# Patient Record
Sex: MALE | Race: WHITE | URBAN - METROPOLITAN AREA
[De-identification: names, ages, dates, MRNs, and addresses within clinical notes are randomized per-mention and may not be internally consistent; named-entity substitution may affect disease eponyms.]

---

## 2017-01-01 ENCOUNTER — EMERGENCY (EMERGENCY)
Facility: HOSPITAL | Age: 82
LOS: 1 days | End: 2017-01-01
Payer: MEDICARE

## 2017-01-01 ENCOUNTER — INPATIENT (INPATIENT)
Facility: HOSPITAL | Age: 82
LOS: 5 days | DRG: 963 | End: 2017-10-14
Attending: SURGERY | Admitting: SURGERY
Payer: MEDICARE

## 2017-01-01 VITALS — WEIGHT: 195.11 LBS | HEIGHT: 68 IN

## 2017-01-01 VITALS — OXYGEN SATURATION: 90 % | HEART RATE: 113 BPM

## 2017-01-01 DIAGNOSIS — Z51.5 ENCOUNTER FOR PALLIATIVE CARE: ICD-10-CM

## 2017-01-01 DIAGNOSIS — G93.41 METABOLIC ENCEPHALOPATHY: ICD-10-CM

## 2017-01-01 DIAGNOSIS — R45.1 RESTLESSNESS AND AGITATION: ICD-10-CM

## 2017-01-01 DIAGNOSIS — R06.02 SHORTNESS OF BREATH: ICD-10-CM

## 2017-01-01 DIAGNOSIS — I61.9 NONTRAUMATIC INTRACEREBRAL HEMORRHAGE, UNSPECIFIED: ICD-10-CM

## 2017-01-01 DIAGNOSIS — Z99.11 DEPENDENCE ON RESPIRATOR [VENTILATOR] STATUS: ICD-10-CM

## 2017-01-01 DIAGNOSIS — G93.40 ENCEPHALOPATHY, UNSPECIFIED: ICD-10-CM

## 2017-01-01 DIAGNOSIS — I62.00 NONTRAUMATIC SUBDURAL HEMORRHAGE, UNSPECIFIED: ICD-10-CM

## 2017-01-01 DIAGNOSIS — R53.81 OTHER MALAISE: ICD-10-CM

## 2017-01-01 LAB
ABO RH CONFIRMATION: SIGNIFICANT CHANGE UP
ALBUMIN SERPL ELPH-MCNC: 2.6 G/DL — LOW (ref 3.3–5.2)
ALBUMIN SERPL ELPH-MCNC: 3 G/DL — LOW (ref 3.3–5.2)
ALP SERPL-CCNC: 50 U/L — SIGNIFICANT CHANGE UP (ref 40–120)
ALP SERPL-CCNC: 55 U/L — SIGNIFICANT CHANGE UP (ref 40–120)
ALT FLD-CCNC: 26 U/L — SIGNIFICANT CHANGE UP
ALT FLD-CCNC: 41 U/L — HIGH
ANION GAP SERPL CALC-SCNC: 10 MMOL/L — SIGNIFICANT CHANGE UP (ref 5–17)
ANION GAP SERPL CALC-SCNC: 11 MMOL/L — SIGNIFICANT CHANGE UP (ref 5–17)
ANION GAP SERPL CALC-SCNC: 16 MMOL/L — SIGNIFICANT CHANGE UP (ref 5–17)
ANION GAP SERPL CALC-SCNC: 18 MMOL/L — HIGH (ref 5–17)
ANION GAP SERPL CALC-SCNC: 19 MMOL/L — HIGH (ref 5–17)
ANION GAP SERPL CALC-SCNC: 20 MMOL/L — HIGH (ref 5–17)
ANION GAP SERPL CALC-SCNC: 9 MMOL/L — SIGNIFICANT CHANGE UP (ref 5–17)
ANION GAP SERPL CALC-SCNC: 9 MMOL/L — SIGNIFICANT CHANGE UP (ref 5–17)
ANISOCYTOSIS BLD QL: SLIGHT — SIGNIFICANT CHANGE UP
ANISOCYTOSIS BLD QL: SLIGHT — SIGNIFICANT CHANGE UP
APTT BLD: 34.8 SEC — SIGNIFICANT CHANGE UP (ref 27.5–37.4)
AST SERPL-CCNC: 33 U/L — SIGNIFICANT CHANGE UP
AST SERPL-CCNC: 74 U/L — HIGH
BASOPHILS # BLD AUTO: 0 K/UL — SIGNIFICANT CHANGE UP (ref 0–0.2)
BASOPHILS NFR BLD AUTO: 0.1 % — SIGNIFICANT CHANGE UP (ref 0–2)
BASOPHILS NFR BLD AUTO: 0.1 % — SIGNIFICANT CHANGE UP (ref 0–2)
BILIRUB SERPL-MCNC: 0.6 MG/DL — SIGNIFICANT CHANGE UP (ref 0.4–2)
BILIRUB SERPL-MCNC: 1.2 MG/DL — SIGNIFICANT CHANGE UP (ref 0.4–2)
BLD GP AB SCN SERPL QL: SIGNIFICANT CHANGE UP
BUN SERPL-MCNC: 30 MG/DL — HIGH (ref 8–20)
BUN SERPL-MCNC: 30 MG/DL — HIGH (ref 8–20)
BUN SERPL-MCNC: 31 MG/DL — HIGH (ref 8–20)
BUN SERPL-MCNC: 32 MG/DL — HIGH (ref 8–20)
BUN SERPL-MCNC: 37 MG/DL — HIGH (ref 8–20)
BUN SERPL-MCNC: 46 MG/DL — HIGH (ref 8–20)
BUN SERPL-MCNC: 48 MG/DL — HIGH (ref 8–20)
BUN SERPL-MCNC: 51 MG/DL — HIGH (ref 8–20)
CALCIUM SERPL-MCNC: 7.5 MG/DL — LOW (ref 8.6–10.2)
CALCIUM SERPL-MCNC: 7.6 MG/DL — LOW (ref 8.6–10.2)
CALCIUM SERPL-MCNC: 7.7 MG/DL — LOW (ref 8.6–10.2)
CALCIUM SERPL-MCNC: 7.8 MG/DL — LOW (ref 8.6–10.2)
CALCIUM SERPL-MCNC: 7.8 MG/DL — LOW (ref 8.6–10.2)
CALCIUM SERPL-MCNC: 7.9 MG/DL — LOW (ref 8.6–10.2)
CALCIUM SERPL-MCNC: 7.9 MG/DL — LOW (ref 8.6–10.2)
CALCIUM SERPL-MCNC: 8.1 MG/DL — LOW (ref 8.6–10.2)
CHLORIDE SERPL-SCNC: 103 MMOL/L — SIGNIFICANT CHANGE UP (ref 98–107)
CHLORIDE SERPL-SCNC: 106 MMOL/L — SIGNIFICANT CHANGE UP (ref 98–107)
CHLORIDE SERPL-SCNC: 107 MMOL/L — SIGNIFICANT CHANGE UP (ref 98–107)
CHLORIDE SERPL-SCNC: 108 MMOL/L — HIGH (ref 98–107)
CK MB CFR SERPL CALC: 12.9 NG/ML — HIGH (ref 0–6.7)
CK MB CFR SERPL CALC: 13.8 NG/ML — HIGH (ref 0–6.7)
CK MB CFR SERPL CALC: 14.4 NG/ML — HIGH (ref 0–6.7)
CK MB CFR SERPL CALC: 6.5 NG/ML — SIGNIFICANT CHANGE UP (ref 0–6.7)
CK SERPL-CCNC: 1491 U/L — HIGH (ref 30–200)
CK SERPL-CCNC: 1783 U/L — HIGH (ref 30–200)
CK SERPL-CCNC: 1800 U/L — HIGH (ref 30–200)
CK SERPL-CCNC: 475 U/L — HIGH (ref 30–200)
CO2 SERPL-SCNC: 19 MMOL/L — LOW (ref 22–29)
CO2 SERPL-SCNC: 19 MMOL/L — LOW (ref 22–29)
CO2 SERPL-SCNC: 20 MMOL/L — LOW (ref 22–29)
CO2 SERPL-SCNC: 22 MMOL/L — SIGNIFICANT CHANGE UP (ref 22–29)
CO2 SERPL-SCNC: 24 MMOL/L — SIGNIFICANT CHANGE UP (ref 22–29)
CO2 SERPL-SCNC: 27 MMOL/L — SIGNIFICANT CHANGE UP (ref 22–29)
CO2 SERPL-SCNC: 28 MMOL/L — SIGNIFICANT CHANGE UP (ref 22–29)
CO2 SERPL-SCNC: 29 MMOL/L — SIGNIFICANT CHANGE UP (ref 22–29)
CREAT SERPL-MCNC: 0.67 MG/DL — SIGNIFICANT CHANGE UP (ref 0.5–1.3)
CREAT SERPL-MCNC: 0.68 MG/DL — SIGNIFICANT CHANGE UP (ref 0.5–1.3)
CREAT SERPL-MCNC: 0.75 MG/DL — SIGNIFICANT CHANGE UP (ref 0.5–1.3)
CREAT SERPL-MCNC: 0.79 MG/DL — SIGNIFICANT CHANGE UP (ref 0.5–1.3)
CREAT SERPL-MCNC: 1.4 MG/DL — HIGH (ref 0.5–1.3)
CREAT SERPL-MCNC: 1.93 MG/DL — HIGH (ref 0.5–1.3)
CREAT SERPL-MCNC: 2.23 MG/DL — HIGH (ref 0.5–1.3)
CREAT SERPL-MCNC: 2.46 MG/DL — HIGH (ref 0.5–1.3)
EOSINOPHIL # BLD AUTO: 0 K/UL — SIGNIFICANT CHANGE UP (ref 0–0.5)
EOSINOPHIL NFR BLD AUTO: 0 % — SIGNIFICANT CHANGE UP (ref 0–6)
EOSINOPHIL NFR BLD AUTO: 0.1 % — SIGNIFICANT CHANGE UP (ref 0–5)
EOSINOPHIL NFR BLD AUTO: 0.3 % — SIGNIFICANT CHANGE UP (ref 0–5)
EOSINOPHIL NFR BLD AUTO: 0.4 % — SIGNIFICANT CHANGE UP (ref 0–5)
EOSINOPHIL NFR BLD AUTO: 1 % — SIGNIFICANT CHANGE UP (ref 0–6)
GAS PNL BLDA: SIGNIFICANT CHANGE UP
GLUCOSE BLDC GLUCOMTR-MCNC: 120 MG/DL — HIGH (ref 70–99)
GLUCOSE BLDC GLUCOMTR-MCNC: 121 MG/DL — HIGH (ref 70–99)
GLUCOSE BLDC GLUCOMTR-MCNC: 130 MG/DL — HIGH (ref 70–99)
GLUCOSE BLDC GLUCOMTR-MCNC: 131 MG/DL — HIGH (ref 70–99)
GLUCOSE BLDC GLUCOMTR-MCNC: 132 MG/DL — HIGH (ref 70–99)
GLUCOSE BLDC GLUCOMTR-MCNC: 140 MG/DL — HIGH (ref 70–99)
GLUCOSE BLDC GLUCOMTR-MCNC: 141 MG/DL — HIGH (ref 70–99)
GLUCOSE BLDC GLUCOMTR-MCNC: 142 MG/DL — HIGH (ref 70–99)
GLUCOSE BLDC GLUCOMTR-MCNC: 154 MG/DL — HIGH (ref 70–99)
GLUCOSE BLDC GLUCOMTR-MCNC: 161 MG/DL — HIGH (ref 70–99)
GLUCOSE BLDC GLUCOMTR-MCNC: 167 MG/DL — HIGH (ref 70–99)
GLUCOSE BLDC GLUCOMTR-MCNC: 180 MG/DL — HIGH (ref 70–99)
GLUCOSE SERPL-MCNC: 108 MG/DL — SIGNIFICANT CHANGE UP (ref 70–115)
GLUCOSE SERPL-MCNC: 113 MG/DL — SIGNIFICANT CHANGE UP (ref 70–115)
GLUCOSE SERPL-MCNC: 115 MG/DL — SIGNIFICANT CHANGE UP (ref 70–115)
GLUCOSE SERPL-MCNC: 121 MG/DL — HIGH (ref 70–115)
GLUCOSE SERPL-MCNC: 143 MG/DL — HIGH (ref 70–115)
GLUCOSE SERPL-MCNC: 145 MG/DL — HIGH (ref 70–115)
GLUCOSE SERPL-MCNC: 177 MG/DL — HIGH (ref 70–115)
GLUCOSE SERPL-MCNC: 185 MG/DL — HIGH (ref 70–115)
HBA1C BLD-MCNC: 5.6 % — SIGNIFICANT CHANGE UP (ref 4–5.6)
HCT VFR BLD CALC: 31.1 % — LOW (ref 42–52)
HCT VFR BLD CALC: 32.3 % — LOW (ref 42–52)
HCT VFR BLD CALC: 32.4 % — LOW (ref 42–52)
HCT VFR BLD CALC: 33.1 % — LOW (ref 42–52)
HCT VFR BLD CALC: 33.2 % — LOW (ref 42–52)
HCT VFR BLD CALC: 35.5 % — LOW (ref 42–52)
HCT VFR BLD CALC: 35.8 % — LOW (ref 42–52)
HCT VFR BLD CALC: 38.3 % — LOW (ref 42–52)
HGB BLD-MCNC: 10.5 G/DL — LOW (ref 14–18)
HGB BLD-MCNC: 10.7 G/DL — LOW (ref 14–18)
HGB BLD-MCNC: 10.9 G/DL — LOW (ref 14–18)
HGB BLD-MCNC: 10.9 G/DL — LOW (ref 14–18)
HGB BLD-MCNC: 11.1 G/DL — LOW (ref 14–18)
HGB BLD-MCNC: 11.3 G/DL — LOW (ref 14–18)
HGB BLD-MCNC: 11.4 G/DL — LOW (ref 14–18)
HGB BLD-MCNC: 12.1 G/DL — LOW (ref 14–18)
HYPOCHROMIA BLD QL: SLIGHT — SIGNIFICANT CHANGE UP
INR BLD: 1.37 RATIO — HIGH (ref 0.88–1.16)
INR BLD: 1.38 RATIO — HIGH (ref 0.88–1.16)
LACTATE BLDV-MCNC: 4.4 MMOL/L — CRITICAL HIGH (ref 0.5–2)
LYMPHOCYTES # BLD AUTO: 0.5 K/UL — LOW (ref 1–4.8)
LYMPHOCYTES # BLD AUTO: 0.5 K/UL — LOW (ref 1–4.8)
LYMPHOCYTES # BLD AUTO: 0.6 K/UL — LOW (ref 1–4.8)
LYMPHOCYTES # BLD AUTO: 0.6 K/UL — LOW (ref 1–4.8)
LYMPHOCYTES # BLD AUTO: 1.1 K/UL — SIGNIFICANT CHANGE UP (ref 1–4.8)
LYMPHOCYTES # BLD AUTO: 7.6 % — LOW (ref 20–55)
LYMPHOCYTES # BLD AUTO: 7.8 % — LOW (ref 20–55)
LYMPHOCYTES # BLD AUTO: 7.9 % — LOW (ref 20–55)
LYMPHOCYTES # BLD AUTO: 8 % — LOW (ref 20–55)
LYMPHOCYTES # BLD AUTO: 8 % — LOW (ref 20–55)
LYMPHOCYTES # BLD AUTO: 8.8 % — LOW (ref 20–55)
LYMPHOCYTES # BLD AUTO: 9 % — LOW (ref 20–55)
MAGNESIUM SERPL-MCNC: 2 MG/DL — SIGNIFICANT CHANGE UP (ref 1.6–2.6)
MAGNESIUM SERPL-MCNC: 2 MG/DL — SIGNIFICANT CHANGE UP (ref 1.6–2.6)
MAGNESIUM SERPL-MCNC: 2 MG/DL — SIGNIFICANT CHANGE UP (ref 1.8–2.6)
MAGNESIUM SERPL-MCNC: 2.1 MG/DL — SIGNIFICANT CHANGE UP (ref 1.6–2.6)
MAGNESIUM SERPL-MCNC: 2.2 MG/DL — SIGNIFICANT CHANGE UP (ref 1.6–2.6)
MCHC RBC-ENTMCNC: 30.3 PG — SIGNIFICANT CHANGE UP (ref 27–31)
MCHC RBC-ENTMCNC: 30.5 PG — SIGNIFICANT CHANGE UP (ref 27–31)
MCHC RBC-ENTMCNC: 30.5 PG — SIGNIFICANT CHANGE UP (ref 27–31)
MCHC RBC-ENTMCNC: 30.6 PG — SIGNIFICANT CHANGE UP (ref 27–31)
MCHC RBC-ENTMCNC: 31.1 PG — HIGH (ref 27–31)
MCHC RBC-ENTMCNC: 31.6 G/DL — LOW (ref 32–36)
MCHC RBC-ENTMCNC: 31.6 PG — HIGH (ref 27–31)
MCHC RBC-ENTMCNC: 31.8 G/DL — LOW (ref 32–36)
MCHC RBC-ENTMCNC: 31.8 G/DL — LOW (ref 32–36)
MCHC RBC-ENTMCNC: 32.9 G/DL — SIGNIFICANT CHANGE UP (ref 32–36)
MCHC RBC-ENTMCNC: 33.1 G/DL — SIGNIFICANT CHANGE UP (ref 32–36)
MCHC RBC-ENTMCNC: 33.4 G/DL — SIGNIFICANT CHANGE UP (ref 32–36)
MCHC RBC-ENTMCNC: 33.6 G/DL — SIGNIFICANT CHANGE UP (ref 32–36)
MCHC RBC-ENTMCNC: 33.8 G/DL — SIGNIFICANT CHANGE UP (ref 32–36)
MCV RBC AUTO: 90.7 FL — SIGNIFICANT CHANGE UP (ref 80–94)
MCV RBC AUTO: 92 FL — SIGNIFICANT CHANGE UP (ref 80–94)
MCV RBC AUTO: 92.7 FL — SIGNIFICANT CHANGE UP (ref 80–94)
MCV RBC AUTO: 93 FL — SIGNIFICANT CHANGE UP (ref 80–94)
MCV RBC AUTO: 93.9 FL — SIGNIFICANT CHANGE UP (ref 80–94)
MCV RBC AUTO: 95.2 FL — HIGH (ref 80–94)
MCV RBC AUTO: 95.2 FL — HIGH (ref 80–94)
MCV RBC AUTO: 96 FL — HIGH (ref 80–94)
MONOCYTES # BLD AUTO: 0.8 K/UL — SIGNIFICANT CHANGE UP (ref 0–0.8)
MONOCYTES # BLD AUTO: 1.1 K/UL — HIGH (ref 0–0.8)
MONOCYTES # BLD AUTO: 1.2 K/UL — HIGH (ref 0–0.8)
MONOCYTES # BLD AUTO: 1.5 K/UL — HIGH (ref 0–0.8)
MONOCYTES # BLD AUTO: 1.7 K/UL — HIGH (ref 0–0.8)
MONOCYTES NFR BLD AUTO: 10 % — SIGNIFICANT CHANGE UP (ref 3–10)
MONOCYTES NFR BLD AUTO: 11.7 % — HIGH (ref 3–10)
MONOCYTES NFR BLD AUTO: 13.4 % — HIGH (ref 3–10)
MONOCYTES NFR BLD AUTO: 15.8 % — HIGH (ref 3–10)
MONOCYTES NFR BLD AUTO: 16.9 % — HIGH (ref 3–10)
MONOCYTES NFR BLD AUTO: 19 % — HIGH (ref 3–10)
MONOCYTES NFR BLD AUTO: 8 % — SIGNIFICANT CHANGE UP (ref 3–10)
NEUTROPHILS # BLD AUTO: 11.5 K/UL — HIGH (ref 1.8–8)
NEUTROPHILS # BLD AUTO: 4.7 K/UL — SIGNIFICANT CHANGE UP (ref 1.8–8)
NEUTROPHILS # BLD AUTO: 5 K/UL — SIGNIFICANT CHANGE UP (ref 1.8–8)
NEUTROPHILS # BLD AUTO: 5.3 K/UL — SIGNIFICANT CHANGE UP (ref 1.8–8)
NEUTROPHILS # BLD AUTO: 5.3 K/UL — SIGNIFICANT CHANGE UP (ref 1.8–8)
NEUTROPHILS NFR BLD AUTO: 50 % — SIGNIFICANT CHANGE UP (ref 37–73)
NEUTROPHILS NFR BLD AUTO: 70 % — SIGNIFICANT CHANGE UP (ref 37–73)
NEUTROPHILS NFR BLD AUTO: 74.5 % — HIGH (ref 37–73)
NEUTROPHILS NFR BLD AUTO: 75.3 % — HIGH (ref 37–73)
NEUTROPHILS NFR BLD AUTO: 77.2 % — HIGH (ref 37–73)
NEUTROPHILS NFR BLD AUTO: 80.3 % — HIGH (ref 37–73)
NEUTROPHILS NFR BLD AUTO: 83 % — HIGH (ref 37–73)
NEUTS BAND # BLD: 2 % — SIGNIFICANT CHANGE UP (ref 0–8)
NEUTS BAND # BLD: 2 % — SIGNIFICANT CHANGE UP (ref 0–8)
OSMOLALITY SERPL: 311 MOS/KG — HIGH (ref 275–300)
OVALOCYTES BLD QL SMEAR: SLIGHT — SIGNIFICANT CHANGE UP
OVALOCYTES BLD QL SMEAR: SLIGHT — SIGNIFICANT CHANGE UP
PA ADP PRP-ACNC: 307 PRU — SIGNIFICANT CHANGE UP (ref 180–376)
PHOSPHATE SERPL-MCNC: 1 MG/DL — CRITICAL LOW (ref 2.4–4.7)
PHOSPHATE SERPL-MCNC: 1.3 MG/DL — LOW (ref 2.4–4.7)
PHOSPHATE SERPL-MCNC: 1.5 MG/DL — LOW (ref 2.4–4.7)
PHOSPHATE SERPL-MCNC: 1.8 MG/DL — LOW (ref 2.4–4.7)
PHOSPHATE SERPL-MCNC: 3.2 MG/DL — SIGNIFICANT CHANGE UP (ref 2.4–4.7)
PHOSPHATE SERPL-MCNC: 4.6 MG/DL — SIGNIFICANT CHANGE UP (ref 2.4–4.7)
PHOSPHATE SERPL-MCNC: 4.8 MG/DL — HIGH (ref 2.4–4.7)
PLAT MORPH BLD: NORMAL — SIGNIFICANT CHANGE UP
PLATELET # BLD AUTO: 121 K/UL — LOW (ref 150–400)
PLATELET # BLD AUTO: 121 K/UL — LOW (ref 150–400)
PLATELET # BLD AUTO: 124 K/UL — LOW (ref 150–400)
PLATELET # BLD AUTO: 124 K/UL — LOW (ref 150–400)
PLATELET # BLD AUTO: 125 K/UL — LOW (ref 150–400)
PLATELET # BLD AUTO: 164 K/UL — SIGNIFICANT CHANGE UP (ref 150–400)
PLATELET # BLD AUTO: 169 K/UL — SIGNIFICANT CHANGE UP (ref 150–400)
PLATELET # BLD AUTO: 175 K/UL — SIGNIFICANT CHANGE UP (ref 150–400)
POIKILOCYTOSIS BLD QL AUTO: SLIGHT — SIGNIFICANT CHANGE UP
POIKILOCYTOSIS BLD QL AUTO: SLIGHT — SIGNIFICANT CHANGE UP
POTASSIUM SERPL-MCNC: 3.6 MMOL/L — SIGNIFICANT CHANGE UP (ref 3.5–5.3)
POTASSIUM SERPL-MCNC: 3.7 MMOL/L — SIGNIFICANT CHANGE UP (ref 3.5–5.3)
POTASSIUM SERPL-MCNC: 3.7 MMOL/L — SIGNIFICANT CHANGE UP (ref 3.5–5.3)
POTASSIUM SERPL-MCNC: 4.2 MMOL/L — SIGNIFICANT CHANGE UP (ref 3.5–5.3)
POTASSIUM SERPL-MCNC: 4.2 MMOL/L — SIGNIFICANT CHANGE UP (ref 3.5–5.3)
POTASSIUM SERPL-MCNC: 4.4 MMOL/L — SIGNIFICANT CHANGE UP (ref 3.5–5.3)
POTASSIUM SERPL-MCNC: 4.4 MMOL/L — SIGNIFICANT CHANGE UP (ref 3.5–5.3)
POTASSIUM SERPL-MCNC: 4.9 MMOL/L — SIGNIFICANT CHANGE UP (ref 3.5–5.3)
POTASSIUM SERPL-SCNC: 3.6 MMOL/L — SIGNIFICANT CHANGE UP (ref 3.5–5.3)
POTASSIUM SERPL-SCNC: 3.7 MMOL/L — SIGNIFICANT CHANGE UP (ref 3.5–5.3)
POTASSIUM SERPL-SCNC: 3.7 MMOL/L — SIGNIFICANT CHANGE UP (ref 3.5–5.3)
POTASSIUM SERPL-SCNC: 4.2 MMOL/L — SIGNIFICANT CHANGE UP (ref 3.5–5.3)
POTASSIUM SERPL-SCNC: 4.2 MMOL/L — SIGNIFICANT CHANGE UP (ref 3.5–5.3)
POTASSIUM SERPL-SCNC: 4.4 MMOL/L — SIGNIFICANT CHANGE UP (ref 3.5–5.3)
POTASSIUM SERPL-SCNC: 4.4 MMOL/L — SIGNIFICANT CHANGE UP (ref 3.5–5.3)
POTASSIUM SERPL-SCNC: 4.9 MMOL/L — SIGNIFICANT CHANGE UP (ref 3.5–5.3)
PROT SERPL-MCNC: 5.2 G/DL — LOW (ref 6.6–8.7)
PROT SERPL-MCNC: 5.3 G/DL — LOW (ref 6.6–8.7)
PROTHROM AB SERPL-ACNC: 15.2 SEC — HIGH (ref 9.8–12.7)
PROTHROM AB SERPL-ACNC: 15.3 SEC — HIGH (ref 9.8–12.7)
RBC # BLD: 3.43 M/UL — LOW (ref 4.6–6.2)
RBC # BLD: 3.45 M/UL — LOW (ref 4.6–6.2)
RBC # BLD: 3.51 M/UL — LOW (ref 4.6–6.2)
RBC # BLD: 3.57 M/UL — LOW (ref 4.6–6.2)
RBC # BLD: 3.57 M/UL — LOW (ref 4.6–6.2)
RBC # BLD: 3.73 M/UL — LOW (ref 4.6–6.2)
RBC # BLD: 3.76 M/UL — LOW (ref 4.6–6.2)
RBC # BLD: 3.99 M/UL — LOW (ref 4.6–6.2)
RBC # FLD: 14.9 % — SIGNIFICANT CHANGE UP (ref 11–15.6)
RBC # FLD: 15.2 % — SIGNIFICANT CHANGE UP (ref 11–15.6)
RBC # FLD: 15.5 % — SIGNIFICANT CHANGE UP (ref 11–15.6)
RBC # FLD: 15.6 % — SIGNIFICANT CHANGE UP (ref 11–15.6)
RBC # FLD: 15.7 % — HIGH (ref 11–15.6)
RBC # FLD: 15.8 % — HIGH (ref 11–15.6)
RBC # FLD: 15.9 % — HIGH (ref 11–15.6)
RBC # FLD: 16.2 % — HIGH (ref 11–15.6)
RBC BLD AUTO: ABNORMAL
RBC BLD AUTO: ABNORMAL
RBC BLD AUTO: NORMAL — SIGNIFICANT CHANGE UP
RBC BLD AUTO: NORMAL — SIGNIFICANT CHANGE UP
SODIUM SERPL-SCNC: 141 MMOL/L — SIGNIFICANT CHANGE UP (ref 135–145)
SODIUM SERPL-SCNC: 141 MMOL/L — SIGNIFICANT CHANGE UP (ref 135–145)
SODIUM SERPL-SCNC: 142 MMOL/L — SIGNIFICANT CHANGE UP (ref 135–145)
SODIUM SERPL-SCNC: 144 MMOL/L — SIGNIFICANT CHANGE UP (ref 135–145)
SODIUM SERPL-SCNC: 144 MMOL/L — SIGNIFICANT CHANGE UP (ref 135–145)
SODIUM SERPL-SCNC: 145 MMOL/L — SIGNIFICANT CHANGE UP (ref 135–145)
SODIUM SERPL-SCNC: 145 MMOL/L — SIGNIFICANT CHANGE UP (ref 135–145)
SODIUM SERPL-SCNC: 146 MMOL/L — HIGH (ref 135–145)
TROPONIN T SERPL-MCNC: 0.05 NG/ML — SIGNIFICANT CHANGE UP (ref 0–0.06)
TROPONIN T SERPL-MCNC: 0.06 NG/ML — SIGNIFICANT CHANGE UP (ref 0–0.06)
TYPE + AB SCN PNL BLD: SIGNIFICANT CHANGE UP
VARIANT LYMPHS # BLD: 1 % — SIGNIFICANT CHANGE UP (ref 0–6)
WBC # BLD: 10.6 K/UL — SIGNIFICANT CHANGE UP (ref 4.8–10.8)
WBC # BLD: 13.8 K/UL — HIGH (ref 4.8–10.8)
WBC # BLD: 14.3 K/UL — HIGH (ref 4.8–10.8)
WBC # BLD: 6.1 K/UL — SIGNIFICANT CHANGE UP (ref 4.8–10.8)
WBC # BLD: 6.6 K/UL — SIGNIFICANT CHANGE UP (ref 4.8–10.8)
WBC # BLD: 6.7 K/UL — SIGNIFICANT CHANGE UP (ref 4.8–10.8)
WBC # BLD: 7.1 K/UL — SIGNIFICANT CHANGE UP (ref 4.8–10.8)
WBC # BLD: 7.4 K/UL — SIGNIFICANT CHANGE UP (ref 4.8–10.8)
WBC # FLD AUTO: 10.6 K/UL — SIGNIFICANT CHANGE UP (ref 4.8–10.8)
WBC # FLD AUTO: 13.8 K/UL — HIGH (ref 4.8–10.8)
WBC # FLD AUTO: 14.3 K/UL — HIGH (ref 4.8–10.8)
WBC # FLD AUTO: 6.1 K/UL — SIGNIFICANT CHANGE UP (ref 4.8–10.8)
WBC # FLD AUTO: 6.6 K/UL — SIGNIFICANT CHANGE UP (ref 4.8–10.8)
WBC # FLD AUTO: 6.7 K/UL — SIGNIFICANT CHANGE UP (ref 4.8–10.8)
WBC # FLD AUTO: 7.1 K/UL — SIGNIFICANT CHANGE UP (ref 4.8–10.8)
WBC # FLD AUTO: 7.4 K/UL — SIGNIFICANT CHANGE UP (ref 4.8–10.8)

## 2017-01-01 PROCEDURE — 99232 SBSQ HOSP IP/OBS MODERATE 35: CPT

## 2017-01-01 PROCEDURE — 72125 CT NECK SPINE W/O DYE: CPT

## 2017-01-01 PROCEDURE — 83036 HEMOGLOBIN GLYCOSYLATED A1C: CPT

## 2017-01-01 PROCEDURE — 31500 INSERT EMERGENCY AIRWAY: CPT

## 2017-01-01 PROCEDURE — 85610 PROTHROMBIN TIME: CPT

## 2017-01-01 PROCEDURE — 96376 TX/PRO/DX INJ SAME DRUG ADON: CPT

## 2017-01-01 PROCEDURE — 86850 RBC ANTIBODY SCREEN: CPT

## 2017-01-01 PROCEDURE — 86901 BLOOD TYPING SEROLOGIC RH(D): CPT

## 2017-01-01 PROCEDURE — 86900 BLOOD TYPING SEROLOGIC ABO: CPT

## 2017-01-01 PROCEDURE — 74176 CT ABD & PELVIS W/O CONTRAST: CPT | Mod: 26

## 2017-01-01 PROCEDURE — 71010: CPT | Mod: 26

## 2017-01-01 PROCEDURE — 99233 SBSQ HOSP IP/OBS HIGH 50: CPT

## 2017-01-01 PROCEDURE — 83605 ASSAY OF LACTIC ACID: CPT

## 2017-01-01 PROCEDURE — 85576 BLOOD PLATELET AGGREGATION: CPT

## 2017-01-01 PROCEDURE — 85730 THROMBOPLASTIN TIME PARTIAL: CPT

## 2017-01-01 PROCEDURE — 70450 CT HEAD/BRAIN W/O DYE: CPT

## 2017-01-01 PROCEDURE — 84484 ASSAY OF TROPONIN QUANT: CPT

## 2017-01-01 PROCEDURE — 71250 CT THORAX DX C-: CPT | Mod: 26

## 2017-01-01 PROCEDURE — 71045 X-RAY EXAM CHEST 1 VIEW: CPT

## 2017-01-01 PROCEDURE — 99285 EMERGENCY DEPT VISIT HI MDM: CPT | Mod: 25

## 2017-01-01 PROCEDURE — 83930 ASSAY OF BLOOD OSMOLALITY: CPT

## 2017-01-01 PROCEDURE — 80053 COMPREHEN METABOLIC PANEL: CPT

## 2017-01-01 PROCEDURE — 96375 TX/PRO/DX INJ NEW DRUG ADDON: CPT

## 2017-01-01 PROCEDURE — 82803 BLOOD GASES ANY COMBINATION: CPT

## 2017-01-01 PROCEDURE — 82435 ASSAY OF BLOOD CHLORIDE: CPT

## 2017-01-01 PROCEDURE — 93306 TTE W/DOPPLER COMPLETE: CPT

## 2017-01-01 PROCEDURE — 99291 CRITICAL CARE FIRST HOUR: CPT | Mod: 25

## 2017-01-01 PROCEDURE — 82330 ASSAY OF CALCIUM: CPT

## 2017-01-01 PROCEDURE — 70450 CT HEAD/BRAIN W/O DYE: CPT | Mod: 26,77

## 2017-01-01 PROCEDURE — 83735 ASSAY OF MAGNESIUM: CPT

## 2017-01-01 PROCEDURE — 82553 CREATINE MB FRACTION: CPT

## 2017-01-01 PROCEDURE — 36556 INSERT NON-TUNNEL CV CATH: CPT

## 2017-01-01 PROCEDURE — 36415 COLL VENOUS BLD VENIPUNCTURE: CPT

## 2017-01-01 PROCEDURE — 93970 EXTREMITY STUDY: CPT | Mod: 26

## 2017-01-01 PROCEDURE — 72170 X-RAY EXAM OF PELVIS: CPT | Mod: 26

## 2017-01-01 PROCEDURE — 36430 TRANSFUSION BLD/BLD COMPNT: CPT

## 2017-01-01 PROCEDURE — P9037: CPT

## 2017-01-01 PROCEDURE — 99231 SBSQ HOSP IP/OBS SF/LOW 25: CPT

## 2017-01-01 PROCEDURE — 84100 ASSAY OF PHOSPHORUS: CPT

## 2017-01-01 PROCEDURE — 71010: CPT | Mod: 26,77,76

## 2017-01-01 PROCEDURE — 71010: CPT | Mod: 26,77

## 2017-01-01 PROCEDURE — 96374 THER/PROPH/DIAG INJ IV PUSH: CPT

## 2017-01-01 PROCEDURE — 94002 VENT MGMT INPAT INIT DAY: CPT

## 2017-01-01 PROCEDURE — 85027 COMPLETE CBC AUTOMATED: CPT

## 2017-01-01 PROCEDURE — 72125 CT NECK SPINE W/O DYE: CPT | Mod: 26

## 2017-01-01 PROCEDURE — 36600 WITHDRAWAL OF ARTERIAL BLOOD: CPT

## 2017-01-01 PROCEDURE — 82962 GLUCOSE BLOOD TEST: CPT

## 2017-01-01 PROCEDURE — 93970 EXTREMITY STUDY: CPT

## 2017-01-01 PROCEDURE — 82947 ASSAY GLUCOSE BLOOD QUANT: CPT

## 2017-01-01 PROCEDURE — 99223 1ST HOSP IP/OBS HIGH 75: CPT

## 2017-01-01 PROCEDURE — 80048 BASIC METABOLIC PNL TOTAL CA: CPT

## 2017-01-01 PROCEDURE — 94003 VENT MGMT INPAT SUBQ DAY: CPT

## 2017-01-01 PROCEDURE — 70450 CT HEAD/BRAIN W/O DYE: CPT | Mod: 26

## 2017-01-01 PROCEDURE — 85014 HEMATOCRIT: CPT

## 2017-01-01 PROCEDURE — 84295 ASSAY OF SERUM SODIUM: CPT

## 2017-01-01 PROCEDURE — 82550 ASSAY OF CK (CPK): CPT

## 2017-01-01 PROCEDURE — 84132 ASSAY OF SERUM POTASSIUM: CPT

## 2017-01-01 RX ORDER — ACETAMINOPHEN 500 MG
1000 TABLET ORAL ONCE
Qty: 0 | Refills: 0 | Status: COMPLETED | OUTPATIENT
Start: 2017-01-01 | End: 2017-01-01

## 2017-01-01 RX ORDER — PANTOPRAZOLE SODIUM 20 MG/1
40 TABLET, DELAYED RELEASE ORAL DAILY
Qty: 0 | Refills: 0 | Status: DISCONTINUED | OUTPATIENT
Start: 2017-01-01 | End: 2017-01-01

## 2017-01-01 RX ORDER — HYDROMORPHONE HYDROCHLORIDE 2 MG/ML
0.5 INJECTION INTRAMUSCULAR; INTRAVENOUS; SUBCUTANEOUS ONCE
Qty: 0 | Refills: 0 | Status: DISCONTINUED | OUTPATIENT
Start: 2017-01-01 | End: 2017-01-01

## 2017-01-01 RX ORDER — GLUCAGON INJECTION, SOLUTION 0.5 MG/.1ML
1 INJECTION, SOLUTION SUBCUTANEOUS ONCE
Qty: 0 | Refills: 0 | Status: DISCONTINUED | OUTPATIENT
Start: 2017-01-01 | End: 2017-01-01

## 2017-01-01 RX ORDER — HEPARIN SODIUM 5000 [USP'U]/ML
5000 INJECTION INTRAVENOUS; SUBCUTANEOUS EVERY 8 HOURS
Qty: 0 | Refills: 0 | Status: DISCONTINUED | OUTPATIENT
Start: 2017-01-01 | End: 2017-01-01

## 2017-01-01 RX ORDER — CALCIUM GLUCONATE 100 MG/ML
2 VIAL (ML) INTRAVENOUS ONCE
Qty: 0 | Refills: 0 | Status: COMPLETED | OUTPATIENT
Start: 2017-01-01 | End: 2017-01-01

## 2017-01-01 RX ORDER — CHLORHEXIDINE GLUCONATE 213 G/1000ML
15 SOLUTION TOPICAL
Qty: 0 | Refills: 0 | Status: DISCONTINUED | OUTPATIENT
Start: 2017-01-01 | End: 2017-01-01

## 2017-01-01 RX ORDER — SODIUM CHLORIDE 9 MG/ML
500 INJECTION, SOLUTION INTRAVENOUS ONCE
Qty: 0 | Refills: 0 | Status: COMPLETED | OUTPATIENT
Start: 2017-01-01 | End: 2017-01-01

## 2017-01-01 RX ORDER — LEVETIRACETAM 250 MG/1
500 TABLET, FILM COATED ORAL EVERY 12 HOURS
Qty: 0 | Refills: 0 | Status: DISCONTINUED | OUTPATIENT
Start: 2017-01-01 | End: 2017-01-01

## 2017-01-01 RX ORDER — HYDROMORPHONE HYDROCHLORIDE 2 MG/ML
0.5 INJECTION INTRAMUSCULAR; INTRAVENOUS; SUBCUTANEOUS
Qty: 100 | Refills: 0 | Status: DISCONTINUED | OUTPATIENT
Start: 2017-01-01 | End: 2017-01-01

## 2017-01-01 RX ORDER — PANTOPRAZOLE SODIUM 20 MG/1
40 TABLET, DELAYED RELEASE ORAL DAILY
Qty: 0 | Refills: 0 | Status: COMPLETED | OUTPATIENT
Start: 2017-01-01 | End: 2017-01-01

## 2017-01-01 RX ORDER — METOPROLOL TARTRATE 50 MG
5 TABLET ORAL ONCE
Qty: 0 | Refills: 0 | Status: DISCONTINUED | OUTPATIENT
Start: 2017-01-01 | End: 2017-01-01

## 2017-01-01 RX ORDER — SODIUM CHLORIDE 9 MG/ML
1000 INJECTION, SOLUTION INTRAVENOUS ONCE
Qty: 0 | Refills: 0 | Status: COMPLETED | OUTPATIENT
Start: 2017-01-01 | End: 2017-01-01

## 2017-01-01 RX ORDER — POTASSIUM CHLORIDE 20 MEQ
40 PACKET (EA) ORAL ONCE
Qty: 0 | Refills: 0 | Status: COMPLETED | OUTPATIENT
Start: 2017-01-01 | End: 2017-01-01

## 2017-01-01 RX ORDER — POTASSIUM PHOSPHATE, MONOBASIC POTASSIUM PHOSPHATE, DIBASIC 236; 224 MG/ML; MG/ML
15 INJECTION, SOLUTION INTRAVENOUS ONCE
Qty: 0 | Refills: 0 | Status: COMPLETED | OUTPATIENT
Start: 2017-01-01 | End: 2017-01-01

## 2017-01-01 RX ORDER — SODIUM CHLORIDE 9 MG/ML
500 INJECTION INTRAMUSCULAR; INTRAVENOUS; SUBCUTANEOUS ONCE
Qty: 0 | Refills: 0 | Status: COMPLETED | OUTPATIENT
Start: 2017-01-01 | End: 2017-01-01

## 2017-01-01 RX ORDER — METOPROLOL TARTRATE 50 MG
5 TABLET ORAL ONCE
Qty: 0 | Refills: 0 | Status: COMPLETED | OUTPATIENT
Start: 2017-01-01 | End: 2017-01-01

## 2017-01-01 RX ORDER — DEXTROSE 50 % IN WATER 50 %
12.5 SYRINGE (ML) INTRAVENOUS ONCE
Qty: 0 | Refills: 0 | Status: DISCONTINUED | OUTPATIENT
Start: 2017-01-01 | End: 2017-01-01

## 2017-01-01 RX ORDER — INSULIN LISPRO 100/ML
VIAL (ML) SUBCUTANEOUS EVERY 6 HOURS
Qty: 0 | Refills: 0 | Status: DISCONTINUED | OUTPATIENT
Start: 2017-01-01 | End: 2017-01-01

## 2017-01-01 RX ORDER — HYDROMORPHONE HYDROCHLORIDE 2 MG/ML
0.5 INJECTION INTRAMUSCULAR; INTRAVENOUS; SUBCUTANEOUS EVERY 4 HOURS
Qty: 0 | Refills: 0 | Status: DISCONTINUED | OUTPATIENT
Start: 2017-01-01 | End: 2017-01-01

## 2017-01-01 RX ORDER — DEXTROSE 50 % IN WATER 50 %
25 SYRINGE (ML) INTRAVENOUS ONCE
Qty: 0 | Refills: 0 | Status: DISCONTINUED | OUTPATIENT
Start: 2017-01-01 | End: 2017-01-01

## 2017-01-01 RX ORDER — SODIUM CHLORIDE 9 MG/ML
1000 INJECTION INTRAMUSCULAR; INTRAVENOUS; SUBCUTANEOUS
Qty: 0 | Refills: 0 | Status: DISCONTINUED | OUTPATIENT
Start: 2017-01-01 | End: 2017-01-01

## 2017-01-01 RX ORDER — HYDROMORPHONE HYDROCHLORIDE 2 MG/ML
1 INJECTION INTRAMUSCULAR; INTRAVENOUS; SUBCUTANEOUS ONCE
Qty: 0 | Refills: 0 | Status: DISCONTINUED | OUTPATIENT
Start: 2017-01-01 | End: 2017-01-01

## 2017-01-01 RX ORDER — HYDROMORPHONE HYDROCHLORIDE 2 MG/ML
0.5 INJECTION INTRAMUSCULAR; INTRAVENOUS; SUBCUTANEOUS
Qty: 0 | Refills: 0 | Status: DISCONTINUED | OUTPATIENT
Start: 2017-01-01 | End: 2017-01-01

## 2017-01-01 RX ORDER — NOREPINEPHRINE BITARTRATE/D5W 8 MG/250ML
0.05 PLASTIC BAG, INJECTION (ML) INTRAVENOUS
Qty: 8 | Refills: 0 | Status: DISCONTINUED | OUTPATIENT
Start: 2017-01-01 | End: 2017-01-01

## 2017-01-01 RX ORDER — SODIUM CHLORIDE 9 MG/ML
1000 INJECTION INTRAMUSCULAR; INTRAVENOUS; SUBCUTANEOUS ONCE
Qty: 0 | Refills: 0 | Status: COMPLETED | OUTPATIENT
Start: 2017-01-01 | End: 2017-01-01

## 2017-01-01 RX ORDER — SODIUM CHLORIDE 9 MG/ML
1000 INJECTION, SOLUTION INTRAVENOUS
Qty: 0 | Refills: 0 | Status: DISCONTINUED | OUTPATIENT
Start: 2017-01-01 | End: 2017-01-01

## 2017-01-01 RX ORDER — ACETAMINOPHEN 500 MG
650 TABLET ORAL EVERY 6 HOURS
Qty: 0 | Refills: 0 | Status: DISCONTINUED | OUTPATIENT
Start: 2017-01-01 | End: 2017-01-01

## 2017-01-01 RX ORDER — HYDROMORPHONE HYDROCHLORIDE 2 MG/ML
1 INJECTION INTRAMUSCULAR; INTRAVENOUS; SUBCUTANEOUS
Qty: 0 | Refills: 0 | Status: DISCONTINUED | OUTPATIENT
Start: 2017-01-01 | End: 2017-01-01

## 2017-01-01 RX ORDER — DEXTROSE 50 % IN WATER 50 %
1 SYRINGE (ML) INTRAVENOUS ONCE
Qty: 0 | Refills: 0 | Status: DISCONTINUED | OUTPATIENT
Start: 2017-01-01 | End: 2017-01-01

## 2017-01-01 RX ADMIN — HEPARIN SODIUM 5000 UNIT(S): 5000 INJECTION INTRAVENOUS; SUBCUTANEOUS at 14:26

## 2017-01-01 RX ADMIN — Medication 1: at 17:34

## 2017-01-01 RX ADMIN — SODIUM CHLORIDE 2000 MILLILITER(S): 9 INJECTION INTRAMUSCULAR; INTRAVENOUS; SUBCUTANEOUS at 14:30

## 2017-01-01 RX ADMIN — Medication 40 MILLIEQUIVALENT(S): at 05:56

## 2017-01-01 RX ADMIN — CHLORHEXIDINE GLUCONATE 15 MILLILITER(S): 213 SOLUTION TOPICAL at 05:23

## 2017-01-01 RX ADMIN — Medication 0.5 MILLIGRAM(S): at 00:45

## 2017-01-01 RX ADMIN — SODIUM CHLORIDE 100 MILLILITER(S): 9 INJECTION, SOLUTION INTRAVENOUS at 06:09

## 2017-01-01 RX ADMIN — SODIUM CHLORIDE 100 MILLILITER(S): 9 INJECTION INTRAMUSCULAR; INTRAVENOUS; SUBCUTANEOUS at 14:30

## 2017-01-01 RX ADMIN — Medication 650 MILLIGRAM(S): at 00:29

## 2017-01-01 RX ADMIN — HYDROMORPHONE HYDROCHLORIDE 0.5 MILLIGRAM(S): 2 INJECTION INTRAMUSCULAR; INTRAVENOUS; SUBCUTANEOUS at 20:30

## 2017-01-01 RX ADMIN — CHLORHEXIDINE GLUCONATE 15 MILLILITER(S): 213 SOLUTION TOPICAL at 17:18

## 2017-01-01 RX ADMIN — CHLORHEXIDINE GLUCONATE 15 MILLILITER(S): 213 SOLUTION TOPICAL at 17:56

## 2017-01-01 RX ADMIN — Medication 400 MILLIGRAM(S): at 17:23

## 2017-01-01 RX ADMIN — LEVETIRACETAM 420 MILLIGRAM(S): 250 TABLET, FILM COATED ORAL at 06:25

## 2017-01-01 RX ADMIN — Medication 400 MILLIGRAM(S): at 06:09

## 2017-01-01 RX ADMIN — Medication 1000 MILLIGRAM(S): at 18:07

## 2017-01-01 RX ADMIN — LEVETIRACETAM 420 MILLIGRAM(S): 250 TABLET, FILM COATED ORAL at 00:31

## 2017-01-01 RX ADMIN — SODIUM CHLORIDE 100 MILLILITER(S): 9 INJECTION, SOLUTION INTRAVENOUS at 17:16

## 2017-01-01 RX ADMIN — HYDROMORPHONE HYDROCHLORIDE 0.5 MILLIGRAM(S): 2 INJECTION INTRAMUSCULAR; INTRAVENOUS; SUBCUTANEOUS at 01:45

## 2017-01-01 RX ADMIN — SODIUM CHLORIDE 1000 MILLILITER(S): 9 INJECTION, SOLUTION INTRAVENOUS at 00:31

## 2017-01-01 RX ADMIN — HEPARIN SODIUM 5000 UNIT(S): 5000 INJECTION INTRAVENOUS; SUBCUTANEOUS at 05:36

## 2017-01-01 RX ADMIN — Medication 400 MILLIGRAM(S): at 09:13

## 2017-01-01 RX ADMIN — SODIUM CHLORIDE 1000 MILLILITER(S): 9 INJECTION, SOLUTION INTRAVENOUS at 09:06

## 2017-01-01 RX ADMIN — Medication 1000 MILLIGRAM(S): at 13:15

## 2017-01-01 RX ADMIN — HYDROMORPHONE HYDROCHLORIDE 0.5 MILLIGRAM(S): 2 INJECTION INTRAMUSCULAR; INTRAVENOUS; SUBCUTANEOUS at 08:50

## 2017-01-01 RX ADMIN — HEPARIN SODIUM 5000 UNIT(S): 5000 INJECTION INTRAVENOUS; SUBCUTANEOUS at 05:50

## 2017-01-01 RX ADMIN — SODIUM CHLORIDE 1000 MILLILITER(S): 9 INJECTION, SOLUTION INTRAVENOUS at 18:16

## 2017-01-01 RX ADMIN — LEVETIRACETAM 420 MILLIGRAM(S): 250 TABLET, FILM COATED ORAL at 05:21

## 2017-01-01 RX ADMIN — SODIUM CHLORIDE 100 MILLILITER(S): 9 INJECTION, SOLUTION INTRAVENOUS at 06:26

## 2017-01-01 RX ADMIN — Medication 400 MILLIGRAM(S): at 02:20

## 2017-01-01 RX ADMIN — PANTOPRAZOLE SODIUM 40 MILLIGRAM(S): 20 TABLET, DELAYED RELEASE ORAL at 11:07

## 2017-01-01 RX ADMIN — SODIUM CHLORIDE 1000 MILLILITER(S): 9 INJECTION, SOLUTION INTRAVENOUS at 16:35

## 2017-01-01 RX ADMIN — Medication 63.75 MILLIMOLE(S): at 05:55

## 2017-01-01 RX ADMIN — Medication 0.5 MILLIGRAM(S): at 17:56

## 2017-01-01 RX ADMIN — Medication 200 GRAM(S): at 20:52

## 2017-01-01 RX ADMIN — PANTOPRAZOLE SODIUM 40 MILLIGRAM(S): 20 TABLET, DELAYED RELEASE ORAL at 11:43

## 2017-01-01 RX ADMIN — Medication 400 MILLIGRAM(S): at 11:43

## 2017-01-01 RX ADMIN — Medication 650 MILLIGRAM(S): at 18:55

## 2017-01-01 RX ADMIN — CHLORHEXIDINE GLUCONATE 15 MILLILITER(S): 213 SOLUTION TOPICAL at 05:21

## 2017-01-01 RX ADMIN — CHLORHEXIDINE GLUCONATE 15 MILLILITER(S): 213 SOLUTION TOPICAL at 06:03

## 2017-01-01 RX ADMIN — Medication 0.5 MILLIGRAM(S): at 18:07

## 2017-01-01 RX ADMIN — HYDROMORPHONE HYDROCHLORIDE 0.5 MG/HR: 2 INJECTION INTRAMUSCULAR; INTRAVENOUS; SUBCUTANEOUS at 14:00

## 2017-01-01 RX ADMIN — HEPARIN SODIUM 5000 UNIT(S): 5000 INJECTION INTRAVENOUS; SUBCUTANEOUS at 22:24

## 2017-01-01 RX ADMIN — HYDROMORPHONE HYDROCHLORIDE 0.5 MG/HR: 2 INJECTION INTRAMUSCULAR; INTRAVENOUS; SUBCUTANEOUS at 13:43

## 2017-01-01 RX ADMIN — Medication: at 11:04

## 2017-01-01 RX ADMIN — Medication 1000 MILLIGRAM(S): at 03:00

## 2017-01-01 RX ADMIN — SODIUM CHLORIDE 100 MILLILITER(S): 9 INJECTION, SOLUTION INTRAVENOUS at 20:53

## 2017-01-01 RX ADMIN — HYDROMORPHONE HYDROCHLORIDE 0.5 MILLIGRAM(S): 2 INJECTION INTRAMUSCULAR; INTRAVENOUS; SUBCUTANEOUS at 08:35

## 2017-01-01 RX ADMIN — PANTOPRAZOLE SODIUM 40 MILLIGRAM(S): 20 TABLET, DELAYED RELEASE ORAL at 17:22

## 2017-01-01 RX ADMIN — PANTOPRAZOLE SODIUM 40 MILLIGRAM(S): 20 TABLET, DELAYED RELEASE ORAL at 11:23

## 2017-01-01 RX ADMIN — POTASSIUM PHOSPHATE, MONOBASIC POTASSIUM PHOSPHATE, DIBASIC 62.5 MILLIMOLE(S): 236; 224 INJECTION, SOLUTION INTRAVENOUS at 10:11

## 2017-01-01 RX ADMIN — Medication 650 MILLIGRAM(S): at 00:26

## 2017-01-01 RX ADMIN — HYDROMORPHONE HYDROCHLORIDE 0.5 MILLIGRAM(S): 2 INJECTION INTRAMUSCULAR; INTRAVENOUS; SUBCUTANEOUS at 02:53

## 2017-01-01 RX ADMIN — POTASSIUM PHOSPHATE, MONOBASIC POTASSIUM PHOSPHATE, DIBASIC 62.5 MILLIMOLE(S): 236; 224 INJECTION, SOLUTION INTRAVENOUS at 17:23

## 2017-01-01 RX ADMIN — SODIUM CHLORIDE 1000 MILLILITER(S): 9 INJECTION, SOLUTION INTRAVENOUS at 23:31

## 2017-01-01 RX ADMIN — HEPARIN SODIUM 5000 UNIT(S): 5000 INJECTION INTRAVENOUS; SUBCUTANEOUS at 15:17

## 2017-01-01 RX ADMIN — Medication 650 MILLIGRAM(S): at 05:21

## 2017-01-01 RX ADMIN — HYDROMORPHONE HYDROCHLORIDE 0.5 MILLIGRAM(S): 2 INJECTION INTRAMUSCULAR; INTRAVENOUS; SUBCUTANEOUS at 03:10

## 2017-01-01 RX ADMIN — PANTOPRAZOLE SODIUM 40 MILLIGRAM(S): 20 TABLET, DELAYED RELEASE ORAL at 12:14

## 2017-01-01 RX ADMIN — HYDROMORPHONE HYDROCHLORIDE 0.5 MILLIGRAM(S): 2 INJECTION INTRAMUSCULAR; INTRAVENOUS; SUBCUTANEOUS at 01:27

## 2017-01-01 RX ADMIN — Medication 1 MILLIGRAM(S): at 12:38

## 2017-01-01 RX ADMIN — Medication 1: at 05:50

## 2017-01-01 RX ADMIN — LEVETIRACETAM 420 MILLIGRAM(S): 250 TABLET, FILM COATED ORAL at 17:17

## 2017-01-01 RX ADMIN — HEPARIN SODIUM 5000 UNIT(S): 5000 INJECTION INTRAVENOUS; SUBCUTANEOUS at 05:21

## 2017-01-01 RX ADMIN — SODIUM CHLORIDE 1000 MILLILITER(S): 9 INJECTION, SOLUTION INTRAVENOUS at 18:09

## 2017-01-01 RX ADMIN — CHLORHEXIDINE GLUCONATE 15 MILLILITER(S): 213 SOLUTION TOPICAL at 19:03

## 2017-01-01 RX ADMIN — SODIUM CHLORIDE 100 MILLILITER(S): 9 INJECTION, SOLUTION INTRAVENOUS at 17:40

## 2017-01-01 RX ADMIN — SODIUM CHLORIDE 100 MILLILITER(S): 9 INJECTION, SOLUTION INTRAVENOUS at 05:21

## 2017-01-01 RX ADMIN — HYDROMORPHONE HYDROCHLORIDE 1 MILLIGRAM(S): 2 INJECTION INTRAMUSCULAR; INTRAVENOUS; SUBCUTANEOUS at 12:52

## 2017-01-01 RX ADMIN — HEPARIN SODIUM 5000 UNIT(S): 5000 INJECTION INTRAVENOUS; SUBCUTANEOUS at 21:43

## 2017-01-01 RX ADMIN — Medication 650 MILLIGRAM(S): at 13:00

## 2017-01-01 RX ADMIN — SODIUM CHLORIDE 1000 MILLILITER(S): 9 INJECTION, SOLUTION INTRAVENOUS at 17:16

## 2017-01-01 RX ADMIN — CHLORHEXIDINE GLUCONATE 15 MILLILITER(S): 213 SOLUTION TOPICAL at 17:33

## 2017-01-01 RX ADMIN — Medication 63.75 MILLIMOLE(S): at 21:27

## 2017-01-01 RX ADMIN — LEVETIRACETAM 420 MILLIGRAM(S): 250 TABLET, FILM COATED ORAL at 06:09

## 2017-01-01 RX ADMIN — Medication 1: at 05:21

## 2017-01-01 RX ADMIN — LEVETIRACETAM 420 MILLIGRAM(S): 250 TABLET, FILM COATED ORAL at 17:33

## 2017-01-01 RX ADMIN — CHLORHEXIDINE GLUCONATE 15 MILLILITER(S): 213 SOLUTION TOPICAL at 06:26

## 2017-01-01 RX ADMIN — HYDROMORPHONE HYDROCHLORIDE 0.5 MILLIGRAM(S): 2 INJECTION INTRAMUSCULAR; INTRAVENOUS; SUBCUTANEOUS at 20:07

## 2017-01-01 RX ADMIN — Medication 200 GRAM(S): at 05:34

## 2017-01-01 RX ADMIN — CHLORHEXIDINE GLUCONATE 15 MILLILITER(S): 213 SOLUTION TOPICAL at 18:07

## 2017-01-01 RX ADMIN — CHLORHEXIDINE GLUCONATE 15 MILLILITER(S): 213 SOLUTION TOPICAL at 17:24

## 2017-01-01 RX ADMIN — POTASSIUM PHOSPHATE, MONOBASIC POTASSIUM PHOSPHATE, DIBASIC 62.5 MILLIMOLE(S): 236; 224 INJECTION, SOLUTION INTRAVENOUS at 05:44

## 2017-01-01 RX ADMIN — Medication 650 MILLIGRAM(S): at 17:33

## 2017-01-01 RX ADMIN — Medication 650 MILLIGRAM(S): at 12:14

## 2017-01-01 RX ADMIN — HEPARIN SODIUM 5000 UNIT(S): 5000 INJECTION INTRAVENOUS; SUBCUTANEOUS at 22:35

## 2017-01-01 RX ADMIN — Medication 1: at 00:29

## 2017-01-01 RX ADMIN — CHLORHEXIDINE GLUCONATE 15 MILLILITER(S): 213 SOLUTION TOPICAL at 06:09

## 2017-01-01 RX ADMIN — SODIUM CHLORIDE 1000 MILLILITER(S): 9 INJECTION INTRAMUSCULAR; INTRAVENOUS; SUBCUTANEOUS at 03:32

## 2017-01-01 RX ADMIN — Medication 0.5 MILLIGRAM(S): at 11:38

## 2017-01-01 RX ADMIN — Medication 1000 MILLIGRAM(S): at 09:28

## 2017-01-01 RX ADMIN — SODIUM CHLORIDE 1000 MILLILITER(S): 9 INJECTION, SOLUTION INTRAVENOUS at 17:23

## 2017-01-01 RX ADMIN — PANTOPRAZOLE SODIUM 40 MILLIGRAM(S): 20 TABLET, DELAYED RELEASE ORAL at 14:25

## 2017-01-01 RX ADMIN — CHLORHEXIDINE GLUCONATE 15 MILLILITER(S): 213 SOLUTION TOPICAL at 18:06

## 2017-01-01 RX ADMIN — CHLORHEXIDINE GLUCONATE 15 MILLILITER(S): 213 SOLUTION TOPICAL at 05:53

## 2017-01-01 RX ADMIN — HYDROMORPHONE HYDROCHLORIDE 1 MILLIGRAM(S): 2 INJECTION INTRAMUSCULAR; INTRAVENOUS; SUBCUTANEOUS at 12:37

## 2017-10-08 NOTE — H&P ADULT - HISTORY OF PRESENT ILLNESS
85 year old male transferred from [facility] following a fall (takes Plavix). He was found down in the nursing home. Family had been calling him and he had not been responding which prompted the staff to look for him. It is unknown how long he had been down for. Prehospital vitals were BP 66/41, HR 80, RR 13 on a ventilator. Received versed and dopamine before arrival to the trauma bay. He arrived intubated, non verbal and non responsive. Primary survey showed    A - Intubated with 7.5mm ET tube and hard C collar in place   B - CTAB   C - 2+ femoral, radial and distal pulses present bilaterally   D - GCS 3T; pinpoint non responsive pupils noted   E - Adequately exposed, log rolled with no step offs noted on exam and appropriately covered with warm blankets    Secondary survey was apparent for a laceration to the forehead, skin tear to the right knuckle, ecchymosis bilateral upper extremities especially in the forearm and hand region and a stage 1 decubitus ulcer to the right hip (no skin tear evident). ED interventions included securing IV access and discontinuing the versed and dopamine. He was subsequently stabilized before being transported to CT scan.     Unable to obtain past medical, surgical and social histories, allergies and medications given patient's altered mental status. 85 year old male transferred from [facility] following a fall (takes Plavix). He was found down in the nursing home. Family had been calling him and he had not been responding which prompted the staff to look for him. It is unknown how long he had been down for. Prehospital vitals were BP 66/41, HR 80, RR 13 on a ventilator. Received versed and dopamine before arrival to the trauma bay. He arrived intubated, non verbal and non responsive. Primary survey showed    A - Intubated with 7.5mm ET tube and hard C collar in place   B - CTAB   C - 2+ femoral, radial and distal pulses present bilaterally   D - GCS 3T; pinpoint non responsive pupils noted   E - Adequately exposed, log rolled with no step offs noted on exam and appropriately covered with warm blankets    Secondary survey was apparent for a laceration to the forehead, skin tear to the right knuckle, ecchymosis bilateral upper extremities especially in the forearm and hand region and a stage 1 decubitus ulcer to the right hip (no skin tear evident). ED interventions included securing IV access and discontinuing the versed and dopamine. He was subsequently stabilized before being transported to CT scan. Unable to obtain past medical, surgical and social histories, allergies and medications given patient's altered mental status. 85 year old male transferred from [facility] following a fall (takes Plavix). He was found down at nursing home. Family had been calling him and he had not been responding which prompted the staff to look for him. It is unknown how long he had been down for. Prehospital vitals were BP 66/41, HR 80, RR 13 on a ventilator. Received versed and dopamine before arrival to the trauma bay. He arrived intubated, non verbal and non responsive. Primary survey showed    A - Intubated with 7.5mm ET tube and hard C collar in place   B - CTAB   C - 2+ femoral, radial and distal pulses present bilaterally   D - GCS 3T; pinpoint non responsive pupils noted   E - Adequately exposed, log rolled with no step offs noted on exam and appropriately covered with warm blankets    Secondary survey was apparent for a laceration to the forehead, skin tear to the right knuckle, ecchymosis bilateral upper extremities especially in the forearm and hand region and a stage 1 decubitus ulcer to the right hip (no skin tear evident). ED interventions included securing IV access and discontinuing the versed and dopamine. He was subsequently stabilized before being transported to CT scan. Unable to obtain past medical, surgical and social histories, allergies and medications given patient's altered mental status.

## 2017-10-08 NOTE — PATIENT PROFILE ADULT. - SPIRITUAL CULTURAL, RELIGIOUS PRACTICES/VALUES, PROFILE
family asked for  to come to bedside I called on call number numerous times and left a message with unit information and number

## 2017-10-08 NOTE — PROVIDER CONTACT NOTE (EICU) - SITUATION
Patient transferred from [facility] following fall. Patient is noted to be on Plavix. Patient is found down @ Nursing Home for unknown time. Initial BP 66/41. CT showed large intraparenchymal hemorrhage.

## 2017-10-08 NOTE — CONSULT NOTE ADULT - SUBJECTIVE AND OBJECTIVE BOX
CONSULT NOTE IS INCOMPLETE      HPI:      PAST MEDICAL & SURGICAL HISTORY:      REVIEW OF SYSTEMS:    CONSTITUTIONAL: No fever, weight loss, or fatigue  EYES: No eye pain, visual disturbances, or discharge  ENMT:  No difficulty hearing, tinnitus, vertigo; No sinus or throat pain  NECK: No pain or stiffness  BREASTS: No pain, masses, or nipple discharge  RESPIRATORY: No cough, wheezing, chills or hemoptysis; No shortness of breath  CARDIOVASCULAR: No chest pain, palpitations, dizziness, or leg swelling  GASTROINTESTINAL: No abdominal or epigastric pain. No nausea, vomiting, or hematemesis; No diarrhea or constipation. No melena or hematochezia.  GENITOURINARY: No dysuria, frequency, hematuria, or incontinence  NEUROLOGICAL: No headaches, memory loss, loss of strength, numbness, or tremors  SKIN: No itching, burning, rashes, or lesions   LYMPH NODES: No enlarged glands  ENDOCRINE: No heat or cold intolerance; No hair loss  MUSCULOSKELETAL: No joint pain or swelling; No muscle, back, or extremity pain  PSYCHIATRIC: No depression, anxiety, mood swings, or difficulty sleeping  HEME/LYMPH: No easy bruising, or bleeding gums  ALLERY AND IMMUNOLOGIC: No hives or eczema    Allergies    No Known Allergies    Intolerances      MEDICATIONS  (STANDING):  chlorhexidine 0.12% Liquid 15 milliLiter(s) Swish and Spit two times a day  norepinephrine Infusion 0.05 MICROgram(s)/kG/Min (8.297 mL/Hr) IV Continuous <Continuous>  pantoprazole  Injectable 40 milliGRAM(s) IV Push daily  sodium chloride 0.9% Bolus 1000 milliLiter(s) IV Bolus once  sodium chloride 0.9%. 1000 milliLiter(s) (100 mL/Hr) IV Continuous <Continuous>    MEDICATIONS  (PRN):    SOCIAL HISTORY:  FAMILY HISTORY:    Vital Signs Last 24 Hrs  T(C): --  T(F): --  HR: --  BP: --  BP(mean): --  RR: --  SpO2: --    PHYSICAL EXAM:    GENERAL: NAD, well-groomed, well-developed  HEAD:  Atraumatic, Normocephalic  EYES: EOMI, PERRLA, conjunctiva and sclera clear  ENMT: No tonsillar erythema, exudates, or enlargement; Moist mucous membranes, Good dentition, No lesions  NECK: Supple, No JVD, Normal thyroid  NERVOUS SYSTEM:  Alert & Oriented X3, Good concentration; Motor Strength 5/5 B/L upper and lower extremities; DTRs 2+ intact and symmetric  CHEST/LUNG: Clear to percussion bilaterally; No rales, rhonchi, wheezing, or rubs  HEART: Regular rate and rhythm; No murmurs, rubs, or gallops  ABDOMEN: Soft, Nontender, Nondistended; Bowel sounds present  EXTREMITIES:  2+ Peripheral Pulses, No clubbing, cyanosis, or edema  LYMPH: No lymphadenopathy noted  SKIN: No rashes or lesions    LABS:                        11.3   14.3  )-----------( 164      ( 08 Oct 2017 14:22 )             35.5     10-08    146<H>  |  108<H>  |  51.0<H>  ----------------------------<  115  4.4   |  20.0<L>  |  2.46<H>    Ca    7.6<L>      08 Oct 2017 14:22    TPro  5.2<L>  /  Alb  3.0<L>  /  TBili  1.2  /  DBili  x   /  AST  74<H>  /  ALT  41<H>  /  AlkPhos  50  10-08    PT/INR - ( 08 Oct 2017 14:22 )   PT: 15.3 sec;   INR: 1.38 ratio         PTT - ( 08 Oct 2017 14:22 )  PTT:34.8 sec      RADIOLOGY & ADDITIONAL STUDIES:  ~~~~~~~~~~~~~~~~~~~~~~~~~~~~~~ HPI:  85 year old male transferred from [facility] following a fall (takes Plavix). He was found down in the nursing home. It is unknown how long he had been down for. Patient is intubated, non-verbal and non-responsive. There are no family members present to help provide a history for the patient.     Unable to obtain past medical, surgical and social histories, allergies, medications, social/family history and ROS given patient is intubated, non-verbal, and non-responsive.     MEDICATIONS  (STANDING):  chlorhexidine 0.12% Liquid 15 milliLiter(s) Swish and Spit two times a day  norepinephrine Infusion 0.05 MICROgram(s)/kG/Min (8.297 mL/Hr) IV Continuous <Continuous>  pantoprazole  Injectable 40 milliGRAM(s) IV Push daily  sodium chloride 0.9% Bolus 1000 milliLiter(s) IV Bolus once  sodium chloride 0.9%. 1000 milliLiter(s) (100 mL/Hr) IV Continuous <Continuous>    PHYSICAL EXAM:    GENERAL: Intubated, sedated, non-responsive.   HEAD:  Atraumatic with laceration to forehead region  EYES: pupils are pinpoint, sluggish, slowly reactive to light.   NEURO/MUSCULOSKELETAL:  Not able to obtain an full exam. Pt does withdraw from pain on LUE only  CHEST/LUNG: Clear to percussion bilaterally; No rales, rhonchi, wheezing, or rubs  HEART: Regular rate and rhythm; No murmurs, rubs, or gallops  ABDOMEN: Soft, Nontender, Nondistended; Bowel sounds present  EXTREMITIES:  2+ Peripheral Pulses, No clubbing, cyanosis, or edema.    LYMPH: No lymphadenopathy noted  SKIN: No rashes or lesions      LABS:                        11.3   14.3  )-----------( 164      ( 08 Oct 2017 14:22 )             35.5     10-08    146<H>  |  108<H>  |  51.0<H>  ----------------------------<  115  4.4   |  20.0<L>  |  2.46<H>    Ca    7.6<L>      08 Oct 2017 14:22    TPro  5.2<L>  /  Alb  3.0<L>  /  TBili  1.2  /  DBili  x   /  AST  74<H>  /  ALT  41<H>  /  AlkPhos  50  10-08    PT/INR - ( 08 Oct 2017 14:22 )   PT: 15.3 sec;   INR: 1.38 ratio         PTT - ( 08 Oct 2017 14:22 )  PTT:34.8 sec      RADIOLOGY & ADDITIONAL STUDIES:  ~~~~~~~~~~~~~~~~~~~~~~~~~~~~~~  Reviewed images with Dr. Santamaria    EXAM:  CT BRAIN                        PROCEDURE DATE:  10/08/2017      Large amount of hemorrhage is seen diffusely throughout the sulci, along   the falx and tentorium. Large intraparenchymal hemorrhages noted inferior   frontal lobes and in the anterior temporal lobes. Intraparenchymal   hemorrhage also seen in the posterior right cerebellum. Subdural blood is   seen along the left convexity. Small amount of intraventricular   hemorrhage is also noted. Small amount of hemorrhage seen in the   interpeduncular cistern. Basal cisterns are otherwise visible. Fourth   ventricle is midline.    Nondisplaced occipital skull fracture.     Layering high density fluid/ blood seen within the sphenoid sinuses.    Impression:    Large amount of intracranial hemorrhage as above. Dr. Redd present   during scan.

## 2017-10-08 NOTE — CONSULT NOTE ADULT - CONSULT REASON
84y/o M s/p fall down flight of stairs (10/8) that transfer from [facility] with ICH. 84y/o M s/p fall down flight of stairs at a nursing home. CT Scan shows ICH. 86y/o M s/p fall at a nursing home. CT Scan shows ICH.

## 2017-10-08 NOTE — H&P ADULT - PROBLEM SELECTOR PLAN 1
-Admit to ICU   -F/U official CT head, c spine, chest, abdomen and pelvis reads   -Currently intubated and sedated   -Keep NPO and IVFs  Neurosurgery consult - await recs

## 2017-10-08 NOTE — H&P ADULT - ATTENDING COMMENTS
Delayed documentation.  Patient seen and examined.  86 [place] [facility] [place] and massive intracranial bleed.  Patient was last seen normal at 4pm that day prior to admission.  Patient was found down, brought [place] [facility] [place] found to have ICH (SAH, SDH and intraparenchymal bleed).  On plavix.  Circumstances of fall unknown as unwitnessed event.  Reportedly a full functional man who drives but [place] [facility] was altered and localizing.  Intubated prior to transfer.  Arrived on versed gtt and dopamine gtt.  Hypotension and nonresponsive 2/2 versed gtt and dopamine gtt (patient had prior full [place] [facility] showing no other injuries so my suspicion for hemorrhagic shock was low).  Versed gtt and dopamine gtt stopped.  IVF fluid challenge given with response of SBP.  Equal breath sounds on auscultation and CXR done to confirm ETT placement (and no hemo-, pneumothorax).  FAST negative.  Labs, images from [place] reviewed.  Patient taken to CT and repeat head CT similar to one [place] [facility]  Patient brought to SICU for supportive care.  Patient started to move limbs and SBP responded to continued fluid administration.  Neurosurgery consulted.  Admit.  Hourly neuro checks.  Hold sedation for now to obtain good neuro exam.  Needs Sandra, TLC.  I spoke at length w/ patient's family which included his stepdaughter and stepgranddaugther about the severity of the bleed and chance for meaningful recovery poor.  KATHY Shin present for discussion.

## 2017-10-08 NOTE — ED PROVIDER NOTE - OBJECTIVE STATEMENT
Pt. brought in by due to as a transfer from [facility] for intracranial head Bleed. Pt. was found on the floor at home today. Unknown downtime.  Pt. is on plavix. Pt. intubated at [place] prior to transfer. Dr. Muniz had accepted the patient.

## 2017-10-08 NOTE — H&P ADULT - NSHPPHYSICALEXAM_GEN_ALL_CORE
Initial ED vitals: BP 86/53, HR 83, RR 14 on vent (tidal volume 500 FiO2 100%, PEEP 5)   Gen: Non verbal with altered mental status, intubated and sedated   HEENT: Normocephalic, atraumatic with laceration to forehead region   Pulm: CTAB  CV: RRR  Abd: Soft, non tender, non distended  : [place] in place   Rectal: No blood evident    Musculoskeletal: No joint pain, swelling or deformity; no limitation of movement

## 2017-10-08 NOTE — H&P ADULT - NSHPLABSRESULTS_GEN_ALL_CORE
LABS:                        11.3   14.3  )-----------( 164      ( 08 Oct 2017 14:22 )             35.5     10-08    146<H>  |  108<H>  |  51.0<H>  ----------------------------<  115  4.4   |  20.0<L>  |  2.46<H>    Ca    7.6<L>      08 Oct 2017 14:22    TPro  5.2<L>  /  Alb  3.0<L>  /  TBili  1.2  /  DBili  x   /  AST  74<H>  /  ALT  41<H>  /  AlkPhos  50  10-08    PT/INR - ( 08 Oct 2017 14:22 )   PT: 15.3 sec;   INR: 1.38 ratio         PTT - ( 08 Oct 2017 14:22 )  PTT:34.8 sec

## 2017-10-09 NOTE — PROGRESS NOTE ADULT - SUBJECTIVE AND OBJECTIVE BOX
INTERVAL HPI/OVERNIGHT EVENTS/SUBJECTIVE:    ICU Vital Signs Last 24 Hrs  T(C): 37.1 (09 Oct 2017 07:00), Max: 37.4 (08 Oct 2017 15:00)  T(F): 98.8 (09 Oct 2017 07:00), Max: 99.3 (08 Oct 2017 15:00)  HR: 83 (09 Oct 2017 10:00) (67 - 94)  BP: 132/53 (08 Oct 2017 18:00) (73/50 - 132/53)  BP(mean): 76 (08 Oct 2017 18:00) (58 - 76)  ABP: 115/47 (09 Oct 2017 10:00) (91/38 - 125/52)  ABP(mean): 67 (09 Oct 2017 10:00) (50 - 129)  RR: 22 (09 Oct 2017 10:00) (14 - 22)  SpO2: 100% (09 Oct 2017 10:00) (97% - 100%)      I&O's Detail    08 Oct 2017 07:01  -  09 Oct 2017 07:00  --------------------------------------------------------  IN:    0.9% NaCl: 500 mL    Lactated Ringers IV Bolus: 1000 mL    multiple electrolytes Injection Type 1: 4300 mL    Platelets - Single Donor: 215 mL    sodium chloride 0.9%: 200 mL  Total IN: 6215 mL    OUT:    Indwelling Catheter - Urethral: 575 mL  Total OUT: 575 mL    Total NET: 5640 mL      09 Oct 2017 07:01  -  09 Oct 2017 10:47  --------------------------------------------------------  IN:    multiple electrolytes Injection Type 1: 200 mL  Total IN: 200 mL    OUT:    Indwelling Catheter - Urethral: 195 mL  Total OUT: 195 mL    Total NET: 5 mL          Mode: AC/ CMV (Assist Control/ Continuous Mandatory Ventilation)  RR (machine): 14  TV (machine): 500  FiO2: 40  PEEP: 8  MAP: 12  PIP: 20    ABG - ( 09 Oct 2017 06:17 )  pH: 7.32  /  pCO2: 42    /  pO2: 107   / HCO3: 21    / Base Excess: -4.5  /  SaO2: 99                  MEDICATIONS  (STANDING):  chlorhexidine 0.12% Liquid 15 milliLiter(s) Swish and Spit two times a day  dextrose 5%. 1000 milliLiter(s) (50 mL/Hr) IV Continuous <Continuous>  dextrose 50% Injectable 12.5 Gram(s) IV Push once  dextrose 50% Injectable 25 Gram(s) IV Push once  dextrose 50% Injectable 25 Gram(s) IV Push once  insulin lispro (HumaLOG) corrective regimen sliding scale   SubCutaneous every 6 hours  levETIRAcetam  IVPB 500 milliGRAM(s) IV Intermittent every 12 hours  multiple electrolytes Injection Type 1 1000 milliLiter(s) (100 mL/Hr) IV Continuous <Continuous>  pantoprazole  Injectable 40 milliGRAM(s) IV Push daily    MEDICATIONS  (PRN):  dextrose Gel 1 Dose(s) Oral once PRN Blood Glucose LESS THAN 70 milliGRAM(s)/deciliter  glucagon  Injectable 1 milliGRAM(s) IntraMuscular once PRN Glucose LESS THAN 70 milligrams/deciliter      NUTRITION/IVF:     CENTRAL LINE:  LOCATION:   DATE INSERTED:  CVP:  SCVO2:    HOYOS:   DATE INSERTED:    A-LINE:    LOCATION:   DATE INSERTED:   SVV:  CO/CI:     CHEST TUBE:  LOCATION:  DATE INSERTED: OUTPUT/24 HRS:  SUCTION/WATER SEAL:     NG/OG TUBE:  DATE INSERTED:  OUTPUT/24 HRS:    MISC:     PHYSICAL EXAM:    Gen:    Eyes:    Neurological:    ENMT:    Neck:    Pulmonary:    Cardiovascular:    Gastrointestinal:    Genitourinary:    Back:    Extremities:    Skin:    Musculoskeletal:          LABS:  CBC Full  -  ( 09 Oct 2017 05:47 )  WBC Count : 10.6 K/uL  Hemoglobin : 12.1 g/dL  Hematocrit : 38.3 %  Platelet Count - Automated : 175 K/uL  Mean Cell Volume : 96.0 fl  Mean Cell Hemoglobin : 30.3 pg  Mean Cell Hemoglobin Concentration : 31.6 g/dL  Auto Neutrophil # : x  Auto Lymphocyte # : x  Auto Monocyte # : x  Auto Eosinophil # : x  Auto Basophil # : x  Auto Neutrophil % : x  Auto Lymphocyte % : x  Auto Monocyte % : x  Auto Eosinophil % : x  Auto Basophil % : x    10-08    144  |  106  |  48.0<H>  ----------------------------<  143<H>  4.9   |  19.0<L>  |  2.23<H>    Ca    7.5<L>      08 Oct 2017 20:09  Phos  4.8     10-08  Mg     2.0     10-08    TPro  5.2<L>  /  Alb  3.0<L>  /  TBili  1.2  /  DBili  x   /  AST  74<H>  /  ALT  41<H>  /  AlkPhos  50  10-08    PT/INR - ( 08 Oct 2017 20:09 )   PT: 15.2 sec;   INR: 1.37 ratio         PTT - ( 08 Oct 2017 14:22 )  PTT:34.8 sec    RECENT CULTURES:      LIVER FUNCTIONS - ( 08 Oct 2017 14:22 )  Alb: 3.0 g/dL / Pro: 5.2 g/dL / ALK PHOS: 50 U/L / ALT: 41 U/L / AST: 74 U/L / GGT: x           CARDIAC MARKERS ( 09 Oct 2017 05:47 )  x     / 0.05 ng/mL / 1491 U/L / x     / 12.9 ng/mL  CARDIAC MARKERS ( 08 Oct 2017 20:09 )  x     / x     / 1800 U/L / x     / 14.4 ng/mL  CARDIAC MARKERS ( 08 Oct 2017 17:30 )  x     / 0.06 ng/mL / 1783 U/L / x     / 13.8 ng/mL      CAPILLARY BLOOD GLUCOSE  110 (09 Oct 2017 06:00)  142 (09 Oct 2017 00:00)      RADIOLOGY & ADDITIONAL STUDIES:    ASSESSMENT/PLAN:  85yMale presenting with:    Neuro:    HEENT:    CV:    Pulm:    GI/Nutrition:    /Renal:    ID:    Lines/Tubes:    Endo:    Skin:    Proph:    Dispo:      CRITICAL CARE TIME SPENT: INTERVAL HPI/OVERNIGHT EVENTS/SUBJECTIVE: no overnight issues.    ICU Vital Signs Last 24 Hrs  T(C): 37.1 (09 Oct 2017 07:00), Max: 37.4 (08 Oct 2017 15:00)  T(F): 98.8 (09 Oct 2017 07:00), Max: 99.3 (08 Oct 2017 15:00)  HR: 83 (09 Oct 2017 10:00) (67 - 94)  BP: 132/53 (08 Oct 2017 18:00) (73/50 - 132/53)  BP(mean): 76 (08 Oct 2017 18:00) (58 - 76)  ABP: 115/47 (09 Oct 2017 10:00) (91/38 - 125/52)  ABP(mean): 67 (09 Oct 2017 10:00) (50 - 129)  RR: 22 (09 Oct 2017 10:00) (14 - 22)  SpO2: 100% (09 Oct 2017 10:00) (97% - 100%)      I&O's Detail    08 Oct 2017 07:01  -  09 Oct 2017 07:00  --------------------------------------------------------  IN:    0.9% NaCl: 500 mL    Lactated Ringers IV Bolus: 1000 mL    multiple electrolytes Injection Type 1: 4300 mL    Platelets - Single Donor: 215 mL    sodium chloride 0.9%: 200 mL  Total IN: 6215 mL    OUT:    Indwelling Catheter - Urethral: 575 mL  Total OUT: 575 mL    Total NET: 5640 mL      09 Oct 2017 07:01  -  09 Oct 2017 10:47  --------------------------------------------------------  IN:    multiple electrolytes Injection Type 1: 200 mL  Total IN: 200 mL    OUT:    Indwelling Catheter - Urethral: 195 mL  Total OUT: 195 mL    Total NET: 5 mL          Mode: AC/ CMV (Assist Control/ Continuous Mandatory Ventilation)  RR (machine): 14  TV (machine): 500  FiO2: 40  PEEP: 8  MAP: 12  PIP: 20    ABG - ( 09 Oct 2017 06:17 )  pH: 7.32  /  pCO2: 42    /  pO2: 107   / HCO3: 21    / Base Excess: -4.5  /  SaO2: 99                  MEDICATIONS  (STANDING):  chlorhexidine 0.12% Liquid 15 milliLiter(s) Swish and Spit two times a day  dextrose 5%. 1000 milliLiter(s) (50 mL/Hr) IV Continuous <Continuous>  dextrose 50% Injectable 12.5 Gram(s) IV Push once  dextrose 50% Injectable 25 Gram(s) IV Push once  dextrose 50% Injectable 25 Gram(s) IV Push once  insulin lispro (HumaLOG) corrective regimen sliding scale   SubCutaneous every 6 hours  levETIRAcetam  IVPB 500 milliGRAM(s) IV Intermittent every 12 hours  multiple electrolytes Injection Type 1 1000 milliLiter(s) (100 mL/Hr) IV Continuous <Continuous>  pantoprazole  Injectable 40 milliGRAM(s) IV Push daily    MEDICATIONS  (PRN):  dextrose Gel 1 Dose(s) Oral once PRN Blood Glucose LESS THAN 70 milliGRAM(s)/deciliter  glucagon  Injectable 1 milliGRAM(s) IntraMuscular once PRN Glucose LESS THAN 70 milligrams/deciliter        PHYSICAL EXAM:    Gen: Non sedated, nAD    Eyes:NAIF    Neurological: GCS 7T, non focal.     ENMT: anicteric sclerae    Neck: NO JVD    Pulmonary: coarse bilateral, no aggregates.    Cardiovascular: S1S2 no aggregates    Gastrointestinal: soft, non tender non distended    Genitourinary: clear urine in santos    Extremities: no edema    Skin: intact    Musculoskeletal: no deformities.          LABS:  CBC Full  -  ( 09 Oct 2017 05:47 )  WBC Count : 10.6 K/uL  Hemoglobin : 12.1 g/dL  Hematocrit : 38.3 %  Platelet Count - Automated : 175 K/uL  Mean Cell Volume : 96.0 fl  Mean Cell Hemoglobin : 30.3 pg  Mean Cell Hemoglobin Concentration : 31.6 g/dL  Auto Neutrophil # : x  Auto Lymphocyte # : x  Auto Monocyte # : x  Auto Eosinophil # : x  Auto Basophil # : x  Auto Neutrophil % : x  Auto Lymphocyte % : x  Auto Monocyte % : x  Auto Eosinophil % : x  Auto Basophil % : x    10-08    144  |  106  |  48.0<H>  ----------------------------<  143<H>  4.9   |  19.0<L>  |  2.23<H>    Ca    7.5<L>      08 Oct 2017 20:09  Phos  4.8     10-08  Mg     2.0     10-08    TPro  5.2<L>  /  Alb  3.0<L>  /  TBili  1.2  /  DBili  x   /  AST  74<H>  /  ALT  41<H>  /  AlkPhos  50  10-08    PT/INR - ( 08 Oct 2017 20:09 )   PT: 15.2 sec;   INR: 1.37 ratio         PTT - ( 08 Oct 2017 14:22 )  PTT:34.8 sec    RECENT CULTURES:      LIVER FUNCTIONS - ( 08 Oct 2017 14:22 )  Alb: 3.0 g/dL / Pro: 5.2 g/dL / ALK PHOS: 50 U/L / ALT: 41 U/L / AST: 74 U/L / GGT: x           CARDIAC MARKERS ( 09 Oct 2017 05:47 )  x     / 0.05 ng/mL / 1491 U/L / x     / 12.9 ng/mL  CARDIAC MARKERS ( 08 Oct 2017 20:09 )  x     / x     / 1800 U/L / x     / 14.4 ng/mL  CARDIAC MARKERS ( 08 Oct 2017 17:30 )  x     / 0.06 ng/mL / 1783 U/L / x     / 13.8 ng/mL      CAPILLARY BLOOD GLUCOSE  110 (09 Oct 2017 06:00)  142 (09 Oct 2017 00:00)      RADIOLOGY & ADDITIONAL STUDIES:    ASSESSMENT/PLAN:  85yMale presenting with: severe TBI, in respiratory failure.    Neuro: appreciate neurosurgery input. poor neurological meaningfully recovery. Palliative consulted will discuss with family goals of care    CV: Perfusion is adequate,     Pulm: Vent supoort for resp failure after TBI    GI/Nutrition: Start TF     /Renal: adequate urine, BUN/CR milsly improved    ID: no issues.     Endo: Glycemia at target    Skin: NO issues    Proph: Star DVT chemoprophylaxis    Dispo: ICU      CRITICAL CARE TIME SPENT: 36 minutes

## 2017-10-09 NOTE — PROCEDURE NOTE - NSPROCDETAILS_GEN_ALL_CORE
blood seen on insertion/dressing applied/flushes easily/ultrasound utilization/secured in place/sterile technique, catheter placed/location identified, draped/prepped, sterile technique used
lumen(s) aspirated and flushed/sterile dressing applied/sterile technique, catheter placed/guidewire recovered
positive blood return obtained via catheter/sutured in place/all materials/supplies accounted for at end of procedure/hemostasis with direct pressure, dressing applied/Seldinger technique/location identified, draped/prepped, sterile technique used, needle inserted/introduced/connected to a pressurized flush line

## 2017-10-09 NOTE — PROCEDURE NOTE - NSINFORMCONSENT_GEN_A_CORE
Benefits, risks, and possible complications of procedure explained to patient/caregiver who verbalized understanding and gave verbal consent.
Benefits, risks, and possible complications of procedure explained to patient/caregiver who verbalized understanding and gave written consent.
Benefits, risks, and possible complications of procedure explained to patient/caregiver who verbalized understanding and gave written consent.

## 2017-10-09 NOTE — CONSULT NOTE ADULT - ATTENDING COMMENTS
Thank you for the opportunity to assist with the care of this patient.   [facility] [phone].

## 2017-10-09 NOTE — PROCEDURE NOTE - NSINDICATIONS_GEN_A_CORE
Venous access
critical illness/hemodynamic monitoring
monitoring purposes/arterial puncture to obtain ABG's/blood sampling/critical patient

## 2017-10-09 NOTE — PROGRESS NOTE ADULT - SUBJECTIVE AND OBJECTIVE BOX
INTERVAL HPI/OVERNIGHT EVENTS:  s/p fall flight of stairs. On plavix     Vital Signs Last 24 Hrs  T(C): 37.5 (09 Oct 2017 12:00), Max: 37.5 (09 Oct 2017 12:00)  T(F): 99.5 (09 Oct 2017 12:00), Max: 99.5 (09 Oct 2017 12:00)  HR: 98 (09 Oct 2017 14:00) (67 - 99)  BP: 132/53 (08 Oct 2017 18:00) (73/50 - 132/53)  BP(mean): 76 (08 Oct 2017 18:00) (58 - 76)  RR: 22 (09 Oct 2017 14:00) (14 - 25)  SpO2: 96% (09 Oct 2017 14:00) (96% - 100%)    PHYSICAL EXAM: Pt remain intubated, lethargic. Collar inplace.     GENERAL: NAD, well-groomed, well-developed  HEAD:  Atraumatic, Normocephalic  EYES: EOMI, PERRLA, conjunctiva and sclera clear  ENMT: No tonsillar erythema, exudates, or enlargement; Moist mucous membranes, Good dentition, No lesions  NECK: Collar inplace,   NERVOUS SYSTEM:  Lethargic  moves the left hand and at times the right hand with stimuli,    CHEST/LUNG: Clear BS bilaterally; No rales, rhonchi, wheezing, or rubs  HEART: Regular rate and rhythm; No murmurs, rubs, or gallops  ABDOMEN: Soft, Nontender, Nondistended; Bowel sounds present  EXTREMITIES: No clubbing, cyanosis, or edema  LYMPH: No lymphadenopathy noted  SKIN: No rashes or lesions    MEDICATIONS  (STANDING):  chlorhexidine 0.12% Liquid 15 milliLiter(s) Swish and Spit two times a day  dextrose 5%. 1000 milliLiter(s) (50 mL/Hr) IV Continuous <Continuous>  dextrose 50% Injectable 12.5 Gram(s) IV Push once  dextrose 50% Injectable 25 Gram(s) IV Push once  dextrose 50% Injectable 25 Gram(s) IV Push once  insulin lispro (HumaLOG) corrective regimen sliding scale   SubCutaneous every 6 hours  levETIRAcetam  IVPB 500 milliGRAM(s) IV Intermittent every 12 hours  multiple electrolytes Injection Type 1 1000 milliLiter(s) (100 mL/Hr) IV Continuous <Continuous>  pantoprazole  Injectable 40 milliGRAM(s) IV Push daily    MEDICATIONS  (PRN):  dextrose Gel 1 Dose(s) Oral once PRN Blood Glucose LESS THAN 70 milliGRAM(s)/deciliter  glucagon  Injectable 1 milliGRAM(s) IntraMuscular once PRN Glucose LESS THAN 70 milligrams/deciliter      Allergies    No Known Allergies    Intolerances        LABS:                        12.1   10.6  )-----------( 175      ( 09 Oct 2017 05:47 )             38.3     10-09    145  |  106  |  46.0<H>  ----------------------------<  113  4.4   |  19.0<L>  |  1.93<H>    Ca    7.8<L>      09 Oct 2017 05:47  Phos  4.6     10-09  Mg     2.1     10-09    TPro  5.2<L>  /  Alb  3.0<L>  /  TBili  1.2  /  DBili  x   /  AST  74<H>  /  ALT  41<H>  /  AlkPhos  50  10-08    PT/INR - ( 08 Oct 2017 20:09 )   PT: 15.2 sec;   INR: 1.37 ratio         PTT - ( 08 Oct 2017 14:22 )  PTT:34.8 sec      RADIOLOGY & ADDITIONAL TESTS:  < from: CT Head No Cont (10.08.17 @ 22:01) >   EXAM:  CT BRAIN                          PROCEDURE DATE:  10/08/2017    IMPRESSION:    Multifocal intracranial hemorrhage including diffuse subarachnoid   hemorrhage, diffuse hemorrhagic contusions in the frontal and temporal   lobes, intraventricular hemorrhage,, interhemispheric, tentorium and left   convexity subdural hematoma, no midline shift.    Fracture of right occipital calvarium and skull base associated   hemorrhagic contusion right cerebellum and hemorrhage within the sphenoid   sinuses bilaterally.  < end of copied text >  < from: CT Cervical Spine No Cont (10.08.17 @ 22:01) >   EXAM:  CT CERVICAL SPINE                          PROCEDURE DATE:  10/08/2017        < end of copied text >  < from: CT Cervical Spine No Cont (10.08.17 @ 22:01) >  IMPRESSION: Linear oblique nondisplaced fracture of the right occipital   calvarium with underlying right posterior cerebellar hematoma, in   addition to diffuse posttraumatic hemorrhage in the visualized brain   parenchyma. Chronic multilevel degenerative cervical spondylosis. Blood   levels in the sphenoid sinuses bilaterally.  < end of copied text >

## 2017-10-09 NOTE — CONSULT NOTE ADULT - PROBLEM SELECTOR RECOMMENDATION 4
-met step kurt Hagan at bedside. Patient has 3 biological children. No official HCP, so decision making would have to be consensus. Step daughter states that she talked to him about his wishes, and he would want to be a DNR, will have to discuss this also with his biological children to make sure there is consensus that these would be his wishes. I explained overall prognosis for him to be back to who he was before this is very poor, however, we need to give him time to see how he does.

## 2017-10-09 NOTE — CONSULT NOTE ADULT - SUBJECTIVE AND OBJECTIVE BOX
HPI: 85M with PMH as listed admitted 10/8 with     PERTINENT PMH REVIEWED: Yes     PAST MEDICAL & SURGICAL HISTORY:    SOCIAL HISTORY:                                     Admitted from:  home  SNF  [facility]     Surrogate/HCP/Guardian: Phone#:    FAMILY HISTORY:    Baseline ADLs (prior to admission):  Independent/ Dependent      Allergies    No Known Allergies    Present Symptoms:     Dyspnea: 0 1 2 3   Nausea/Vomiting: Yes No  Anxiety:  Yes No  Depression: Yes No  Fatigue: Yes No  Loss of appetite: Yes No    Pain:             Character-            Duration-            Effect-            Factors-            Frequency-            Location-            Severity-    Review of Systems: Reviewed                     Negative:                     Positive:  Unable to obtain due to poor mentation   All others negative    MEDICATIONS  (STANDING):  chlorhexidine 0.12% Liquid 15 milliLiter(s) Swish and Spit two times a day  dextrose 5%. 1000 milliLiter(s) (50 mL/Hr) IV Continuous <Continuous>  dextrose 50% Injectable 12.5 Gram(s) IV Push once  dextrose 50% Injectable 25 Gram(s) IV Push once  dextrose 50% Injectable 25 Gram(s) IV Push once  insulin lispro (HumaLOG) corrective regimen sliding scale   SubCutaneous every 6 hours  levETIRAcetam  IVPB 500 milliGRAM(s) IV Intermittent every 12 hours  multiple electrolytes Injection Type 1 1000 milliLiter(s) (100 mL/Hr) IV Continuous <Continuous>    MEDICATIONS  (PRN):  dextrose Gel 1 Dose(s) Oral once PRN Blood Glucose LESS THAN 70 milliGRAM(s)/deciliter  glucagon  Injectable 1 milliGRAM(s) IntraMuscular once PRN Glucose LESS THAN 70 milligrams/deciliter    PHYSICAL EXAM:    Vital Signs Last 24 Hrs  T(C): 37.5 (09 Oct 2017 12:00), Max: 37.5 (09 Oct 2017 12:00)  T(F): 99.5 (09 Oct 2017 12:00), Max: 99.5 (09 Oct 2017 12:00)  HR: 98 (09 Oct 2017 14:00) (67 - 99)  BP: 132/53 (08 Oct 2017 18:00) (73/50 - 132/53)  BP(mean): 76 (08 Oct 2017 18:00) (58 - 76)  RR: 22 (09 Oct 2017 14:00) (14 - 25)  SpO2: 96% (09 Oct 2017 14:00) (96% - 100%)    General: alert  oriented x ____ lethargic agitated                  cachexia  nonverbal  coma    Karnofsky:  %    HEENT: normal  dry mouth  ET tube/trach    Lungs: comfortable tachypnea/labored breathing  excessive secretions    CV: normal  tachycardia    GI: normal  distended  tender  no BS               PEG/NG/OG tube  constipation  last BM:     : normal  incontinent  oliguria/anuria  santos    MSK: normal  weakness  edema             ambulatory  bedbound/wheelchair bound    Skin: normal  pressure ulcers- Stage_____  no rash    LABS:                      12.1   10.6  )-----------( 175      ( 09 Oct 2017 05:47 )             38.3     10-09    145  |  106  |  46.0<H>  ----------------------------<  113  4.4   |  19.0<L>  |  1.93<H>    Ca    7.8<L>      09 Oct 2017 05:47  Phos  4.6     10-09  Mg     2.1     10-09    TPro  5.2<L>  /  Alb  3.0<L>  /  TBili  1.2  /  DBili  x   /  AST  74<H>  /  ALT  41<H>  /  AlkPhos  50  10-08    PT/INR - ( 08 Oct 2017 20:09 )   PT: 15.2 sec;   INR: 1.37 ratio       PTT - ( 08 Oct 2017 14:22 )  PTT:34.8 sec    I&O's Summary    08 Oct 2017 07:01  -  09 Oct 2017 07:00  --------------------------------------------------------  IN: 6215 mL / OUT: 575 mL / NET: 5640 mL    09 Oct 2017 07:01  -  09 Oct 2017 14:52  --------------------------------------------------------  IN: 800 mL / OUT: 355 mL / NET: 445 mL        RADIOLOGY & ADDITIONAL STUDIES:    ADVANCE DIRECTIVES:   DNR YES NO  Completed on:                     MOLST  YES NO   Completed on:  Living Will  YES NO   Completed on: HPI: 85M with PMH as listed admitted 10/8 with     PERTINENT PMH REVIEWED: Yes     PAST MEDICAL & SURGICAL HISTORY:    SOCIAL HISTORY:                                     Admitted from:  home      Surrogate -     FAMILY HISTORY:    Baseline ADLs (prior to admission):  Independent/ Dependent      Allergies    No Known Allergies    Present Symptoms:     Dyspnea: 0 1 2 3   Nausea/Vomiting: Yes No  Anxiety:  Yes No  Depression: Yes No  Fatigue: Yes No  Loss of appetite: Yes No    Pain:             Character-            Duration-            Effect-            Factors-            Frequency-            Location-            Severity-    Review of Systems: Reviewed                     Negative:                     Positive:  Unable to obtain due to poor mentation   All others negative    MEDICATIONS  (STANDING):  chlorhexidine 0.12% Liquid 15 milliLiter(s) Swish and Spit two times a day  dextrose 5%. 1000 milliLiter(s) (50 mL/Hr) IV Continuous <Continuous>  dextrose 50% Injectable 12.5 Gram(s) IV Push once  dextrose 50% Injectable 25 Gram(s) IV Push once  dextrose 50% Injectable 25 Gram(s) IV Push once  insulin lispro (HumaLOG) corrective regimen sliding scale   SubCutaneous every 6 hours  levETIRAcetam  IVPB 500 milliGRAM(s) IV Intermittent every 12 hours  multiple electrolytes Injection Type 1 1000 milliLiter(s) (100 mL/Hr) IV Continuous <Continuous>    MEDICATIONS  (PRN):  dextrose Gel 1 Dose(s) Oral once PRN Blood Glucose LESS THAN 70 milliGRAM(s)/deciliter  glucagon  Injectable 1 milliGRAM(s) IntraMuscular once PRN Glucose LESS THAN 70 milligrams/deciliter    PHYSICAL EXAM:    Vital Signs Last 24 Hrs  T(C): 37.5 (09 Oct 2017 12:00), Max: 37.5 (09 Oct 2017 12:00)  T(F): 99.5 (09 Oct 2017 12:00), Max: 99.5 (09 Oct 2017 12:00)  HR: 98 (09 Oct 2017 14:00) (67 - 99)  BP: 132/53 (08 Oct 2017 18:00) (73/50 - 132/53)  BP(mean): 76 (08 Oct 2017 18:00) (58 - 76)  RR: 22 (09 Oct 2017 14:00) (14 - 25)  SpO2: 96% (09 Oct 2017 14:00) (96% - 100%)    General: alert  oriented x ____ lethargic agitated                  cachexia  nonverbal  coma    Karnofsky:  %    HEENT: normal  dry mouth  ET tube/trach    Lungs: comfortable tachypnea/labored breathing  excessive secretions    CV: normal  tachycardia    GI: normal  distended  tender  no BS               PEG/NG/OG tube  constipation  last BM:     : normal  incontinent  oliguria/anuria  santos    MSK: normal  weakness  edema             ambulatory  bedbound/wheelchair bound    Skin: normal  pressure ulcers- Stage_____  no rash    LABS:                      12.1   10.6  )-----------( 175      ( 09 Oct 2017 05:47 )             38.3     10-09    145  |  106  |  46.0<H>  ----------------------------<  113  4.4   |  19.0<L>  |  1.93<H>    Ca    7.8<L>      09 Oct 2017 05:47  Phos  4.6     10-09  Mg     2.1     10-09    TPro  5.2<L>  /  Alb  3.0<L>  /  TBili  1.2  /  DBili  x   /  AST  74<H>  /  ALT  41<H>  /  AlkPhos  50  10-08    PT/INR - ( 08 Oct 2017 20:09 )   PT: 15.2 sec;   INR: 1.37 ratio       PTT - ( 08 Oct 2017 14:22 )  PTT:34.8 sec    I&O's Summary    08 Oct 2017 07:01  -  09 Oct 2017 07:00  --------------------------------------------------------  IN: 6215 mL / OUT: 575 mL / NET: 5640 mL    09 Oct 2017 07:01  -  09 Oct 2017 14:52  --------------------------------------------------------  IN: 800 mL / OUT: 355 mL / NET: 445 mL        RADIOLOGY & ADDITIONAL STUDIES:    ADVANCE DIRECTIVES:   DNR YES NO  Completed on:                     MOLST  YES NO   Completed on:  Living Will  YES NO   Completed on: HPI: 85M with PMH as listed admitted 10/8 from [facility] with a fall. He was found to have ICH. Currently on ventilator in SICU.     PERTINENT PMH REVIEWED: Yes     PAST MEDICAL & SURGICAL HISTORY:    SOCIAL HISTORY:  no EtOH                                    Admitted from:  [facility]    Surrogate - Danya Tilley     FAMILY HISTORY:    Baseline ADLs (prior to admission):  Independent/ Dependent      Allergies    No Known Allergies    Present Symptoms:     Dyspnea vented   Nausea/Vomiting: No  Anxiety:  No  Depression: unable   Fatigue: yes   Loss of appetite: unable     Pain: none             Character-            Duration-            Effect-            Factors-            Frequency-            Location-            Severity-    Review of Systems: Reviewed                     Negative:                     Positive:  Unable to obtain due to poor mentation   All others negative    MEDICATIONS  (STANDING):  chlorhexidine 0.12% Liquid 15 milliLiter(s) Swish and Spit two times a day  dextrose 5%. 1000 milliLiter(s) (50 mL/Hr) IV Continuous <Continuous>  dextrose 50% Injectable 12.5 Gram(s) IV Push once  dextrose 50% Injectable 25 Gram(s) IV Push once  dextrose 50% Injectable 25 Gram(s) IV Push once  insulin lispro (HumaLOG) corrective regimen sliding scale   SubCutaneous every 6 hours  levETIRAcetam  IVPB 500 milliGRAM(s) IV Intermittent every 12 hours  multiple electrolytes Injection Type 1 1000 milliLiter(s) (100 mL/Hr) IV Continuous <Continuous>    MEDICATIONS  (PRN):  dextrose Gel 1 Dose(s) Oral once PRN Blood Glucose LESS THAN 70 milliGRAM(s)/deciliter  glucagon  Injectable 1 milliGRAM(s) IntraMuscular once PRN Glucose LESS THAN 70 milligrams/deciliter    PHYSICAL EXAM:    Vital Signs Last 24 Hrs  T(C): 37.5 (09 Oct 2017 12:00), Max: 37.5 (09 Oct 2017 12:00)  T(F): 99.5 (09 Oct 2017 12:00), Max: 99.5 (09 Oct 2017 12:00)  HR: 98 (09 Oct 2017 14:00) (67 - 99)  BP: 132/53 (08 Oct 2017 18:00) (73/50 - 132/53)  BP(mean): 76 (08 Oct 2017 18:00) (58 - 76)  RR: 22 (09 Oct 2017 14:00) (14 - 25)  SpO2: 96% (09 Oct 2017 14:00) (96% - 100%)    General: lethargic, moving left upper extremity spontaneously     Karnofsky:  30 %    HEENT:  ET tube c collar in place     Lungs: comfortable    CV: normal      GI: OG tube     : santos    MSK: weakness     Skin: no rash    LABS:                      12.1   10.6  )-----------( 175      ( 09 Oct 2017 05:47 )             38.3     10-09    145  |  106  |  46.0<H>  ----------------------------<  113  4.4   |  19.0<L>  |  1.93<H>    Ca    7.8<L>      09 Oct 2017 05:47  Phos  4.6     10-09  Mg     2.1     10-09    TPro  5.2<L>  /  Alb  3.0<L>  /  TBili  1.2  /  DBili  x   /  AST  74<H>  /  ALT  41<H>  /  AlkPhos  50  10-08    PT/INR - ( 08 Oct 2017 20:09 )   PT: 15.2 sec;   INR: 1.37 ratio       PTT - ( 08 Oct 2017 14:22 )  PTT:34.8 sec    I&O's Summary    08 Oct 2017 07:01  -  09 Oct 2017 07:00  --------------------------------------------------------  IN: 6215 mL / OUT: 575 mL / NET: 5640 mL    09 Oct 2017 07:01  -  09 Oct 2017 14:52  --------------------------------------------------------  IN: 800 mL / OUT: 355 mL / NET: 445 mL    RADIOLOGY & ADDITIONAL STUDIES:    < from: CT Head No Cont (10.08.17 @ 22:01) >  Multifocal intracranial hemorrhage including diffuse subarachnoid hemorrhage, diffuse hemorrhagic contusions in the frontal and temporal   lobes, intraventricular hemorrhage,, interhemispheric, tentorium and left convexity subdural hematoma, no midline shift.  Fracture of right occipital calvarium and skull base associated hemorrhagic contusion right cerebellum and hemorrhage within the sphenoid   sinuses bilaterally.    ADVANCE DIRECTIVES: Full code HPI: 85M with PMH as listed admitted 10/8 from [facility] with a fall. He was found to have ICH. Currently on ventilator in SICU.     Per step daughter, he has been falling alot and has needed to walk around with assistive devices. She was [facility].     PERTINENT PMH REVIEWED: Yes     PAST MEDICAL & SURGICAL HISTORY:    SOCIAL HISTORY:  no EtOH                                    Admitted from:  [facility]    Surrogate - Danya Tilley     FAMILY HISTORY:    Baseline ADLs (prior to admission):  Dependent      Allergies    No Known Allergies    Present Symptoms:     Dyspnea vented   Nausea/Vomiting: No  Anxiety:  No  Depression: unable   Fatigue: yes   Loss of appetite: unable     Pain: none             Character-            Duration-            Effect-            Factors-            Frequency-            Location-            Severity-    Review of Systems: Reviewed                     Negative:                     Positive:  Unable to obtain due to poor mentation   All others negative    MEDICATIONS  (STANDING):  chlorhexidine 0.12% Liquid 15 milliLiter(s) Swish and Spit two times a day  dextrose 5%. 1000 milliLiter(s) (50 mL/Hr) IV Continuous <Continuous>  dextrose 50% Injectable 12.5 Gram(s) IV Push once  dextrose 50% Injectable 25 Gram(s) IV Push once  dextrose 50% Injectable 25 Gram(s) IV Push once  insulin lispro (HumaLOG) corrective regimen sliding scale   SubCutaneous every 6 hours  levETIRAcetam  IVPB 500 milliGRAM(s) IV Intermittent every 12 hours  multiple electrolytes Injection Type 1 1000 milliLiter(s) (100 mL/Hr) IV Continuous <Continuous>    MEDICATIONS  (PRN):  dextrose Gel 1 Dose(s) Oral once PRN Blood Glucose LESS THAN 70 milliGRAM(s)/deciliter  glucagon  Injectable 1 milliGRAM(s) IntraMuscular once PRN Glucose LESS THAN 70 milligrams/deciliter    PHYSICAL EXAM:    Vital Signs Last 24 Hrs  T(C): 37.5 (09 Oct 2017 12:00), Max: 37.5 (09 Oct 2017 12:00)  T(F): 99.5 (09 Oct 2017 12:00), Max: 99.5 (09 Oct 2017 12:00)  HR: 98 (09 Oct 2017 14:00) (67 - 99)  BP: 132/53 (08 Oct 2017 18:00) (73/50 - 132/53)  BP(mean): 76 (08 Oct 2017 18:00) (58 - 76)  RR: 22 (09 Oct 2017 14:00) (14 - 25)  SpO2: 96% (09 Oct 2017 14:00) (96% - 100%)    General: lethargic, moving left upper extremity spontaneously     Karnofsky:  30 %    HEENT:  ET tube c collar in place     Lungs: comfortable    CV: normal      GI: OG tube     : danielle    MSK: weakness     Skin: no rash    LABS:                      12.1   10.6  )-----------( 175      ( 09 Oct 2017 05:47 )             38.3     10-09    145  |  106  |  46.0<H>  ----------------------------<  113  4.4   |  19.0<L>  |  1.93<H>    Ca    7.8<L>      09 Oct 2017 05:47  Phos  4.6     10-09  Mg     2.1     10-09    TPro  5.2<L>  /  Alb  3.0<L>  /  TBili  1.2  /  DBili  x   /  AST  74<H>  /  ALT  41<H>  /  AlkPhos  50  10-08    PT/INR - ( 08 Oct 2017 20:09 )   PT: 15.2 sec;   INR: 1.37 ratio       PTT - ( 08 Oct 2017 14:22 )  PTT:34.8 sec    I&O's Summary    08 Oct 2017 07:01  -  09 Oct 2017 07:00  --------------------------------------------------------  IN: 6215 mL / OUT: 575 mL / NET: 5640 mL    09 Oct 2017 07:01  -  09 Oct 2017 14:52  --------------------------------------------------------  IN: 800 mL / OUT: 355 mL / NET: 445 mL    RADIOLOGY & ADDITIONAL STUDIES:    < from: CT Head No Cont (10.08.17 @ 22:01) >  Multifocal intracranial hemorrhage including diffuse subarachnoid hemorrhage, diffuse hemorrhagic contusions in the frontal and temporal   lobes, intraventricular hemorrhage,, interhemispheric, tentorium and left convexity subdural hematoma, no midline shift.  Fracture of right occipital calvarium and skull base associated hemorrhagic contusion right cerebellum and hemorrhage within the sphenoid   sinuses bilaterally.    ADVANCE DIRECTIVES: Full code

## 2017-10-10 NOTE — DIETITIAN INITIAL EVALUATION ADULT. - PERTINENT LABORATORY DATA
110-10 Na145 mmol/L Glu 121 mg/dL<H> K+ 4.2 mmol/L Cr  1.40 mg/dL<H> BUN 37.0 mg/dL<H> Phos 3.2 mg/dL Alb n/a   PAB n/a

## 2017-10-10 NOTE — PROGRESS NOTE ADULT - SUBJECTIVE AND OBJECTIVE BOX
INTERVAL HPI/OVERNIGHT EVENTS/SUBJECTIVE: no issues overnight.    ICU Vital Signs Last 24 Hrs  T(C): 35.9 (10 Oct 2017 08:00), Max: 37.5 (09 Oct 2017 12:00)  T(F): 96.6 (10 Oct 2017 08:00), Max: 99.5 (09 Oct 2017 12:00)  HR: 80 (10 Oct 2017 11:00) (76 - 101)  BP: 105/59 (10 Oct 2017 04:00) (76/38 - 122/66)  BP(mean): 82 (10 Oct 2017 04:00) (52 - 82)  ABP: 122/49 (10 Oct 2017 11:00) (71/34 - 192/192)  ABP(mean): 70 (10 Oct 2017 11:00) (43 - 192)  RR: 21 (10 Oct 2017 11:00) (15 - 25)  SpO2: 89% (10 Oct 2017 11:00) (83% - 100%)      I&O's Detail    09 Oct 2017 07:01  -  10 Oct 2017 07:00  --------------------------------------------------------  IN:    Free Water: 600 mL    IV PiggyBack: 200 mL    Lactated Ringers IV Bolus: 500 mL    multiple electrolytes Injection Type 1: 2800 mL    Solution: 100 mL  Total IN: 4200 mL    OUT:    Indwelling Catheter - Urethral: 1170 mL  Total OUT: 1170 mL    Total NET: 3030 mL      10 Oct 2017 07:01  -  10 Oct 2017 11:36  --------------------------------------------------------  IN:    multiple electrolytes Injection Type 1: 500 mL    Pivot: 120 mL  Total IN: 620 mL    OUT:    Indwelling Catheter - Urethral: 205 mL  Total OUT: 205 mL    Total NET: 415 mL          Mode: AC/ CMV (Assist Control/ Continuous Mandatory Ventilation)  RR (machine): 14  TV (machine): 500  FiO2: 40  PEEP: 8  MAP: 15  PIP: 22    ABG - ( 10 Oct 2017 03:50 )  pH: 7.34  /  pCO2: 41    /  pO2: 117   / HCO3: 22    / Base Excess: -3.5  /  SaO2: 99                  MEDICATIONS  (STANDING):  chlorhexidine 0.12% Liquid 15 milliLiter(s) Swish and Spit two times a day  dextrose 5%. 1000 milliLiter(s) (50 mL/Hr) IV Continuous <Continuous>  dextrose 50% Injectable 12.5 Gram(s) IV Push once  dextrose 50% Injectable 25 Gram(s) IV Push once  dextrose 50% Injectable 25 Gram(s) IV Push once  insulin lispro (HumaLOG) corrective regimen sliding scale   SubCutaneous every 6 hours  levETIRAcetam  IVPB 500 milliGRAM(s) IV Intermittent every 12 hours  multiple electrolytes Injection Type 1 1000 milliLiter(s) (100 mL/Hr) IV Continuous <Continuous>  pantoprazole  Injectable 40 milliGRAM(s) IV Push daily    MEDICATIONS  (PRN):  dextrose Gel 1 Dose(s) Oral once PRN Blood Glucose LESS THAN 70 milliGRAM(s)/deciliter  glucagon  Injectable 1 milliGRAM(s) IntraMuscular once PRN Glucose LESS THAN 70 milligrams/deciliter          PHYSICAL EXAM:    Gen:    Eyes:    Neurological:    ENMT:    Neck:    Pulmonary:    Cardiovascular:    Gastrointestinal:    Genitourinary:    Back:    Extremities:    Skin:    Musculoskeletal:          LABS:  CBC Full  -  ( 10 Oct 2017 05:31 )  WBC Count : 6.6 K/uL  Hemoglobin : 10.9 g/dL  Hematocrit : 32.4 %  Platelet Count - Automated : 124 K/uL  Mean Cell Volume : 93.9 fl  Mean Cell Hemoglobin : 31.6 pg  Mean Cell Hemoglobin Concentration : 33.6 g/dL  Auto Neutrophil # : x  Auto Lymphocyte # : x  Auto Monocyte # : x  Auto Eosinophil # : x  Auto Basophil # : x  Auto Neutrophil % : 83.0 %  Auto Lymphocyte % : 8.0 %  Auto Monocyte % : 8.0 %  Auto Eosinophil % : x  Auto Basophil % : x    10-10    145  |  107  |  37.0<H>  ----------------------------<  121<H>  4.2   |  22.0  |  1.40<H>    Ca    7.9<L>      10 Oct 2017 05:31  Phos  3.2     10-10  Mg     2.2     10-10    TPro  5.2<L>  /  Alb  3.0<L>  /  TBili  1.2  /  DBili  x   /  AST  74<H>  /  ALT  41<H>  /  AlkPhos  50  10-08    PT/INR - ( 08 Oct 2017 20:09 )   PT: 15.2 sec;   INR: 1.37 ratio         PTT - ( 08 Oct 2017 14:22 )  PTT:34.8 sec    RECENT CULTURES:      LIVER FUNCTIONS - ( 08 Oct 2017 14:22 )  Alb: 3.0 g/dL / Pro: 5.2 g/dL / ALK PHOS: 50 U/L / ALT: 41 U/L / AST: 74 U/L / GGT: x           CARDIAC MARKERS ( 10 Oct 2017 05:31 )  x     / x     / 475 U/L / x     / 6.5 ng/mL  CARDIAC MARKERS ( 09 Oct 2017 05:47 )  x     / 0.05 ng/mL / 1491 U/L / x     / 12.9 ng/mL  CARDIAC MARKERS ( 08 Oct 2017 20:09 )  x     / x     / 1800 U/L / x     / 14.4 ng/mL  CARDIAC MARKERS ( 08 Oct 2017 17:30 )  x     / 0.06 ng/mL / 1783 U/L / x     / 13.8 ng/mL      CAPILLARY BLOOD GLUCOSE  121 (10 Oct 2017 06:00)  111 (10 Oct 2017 00:00)      RADIOLOGY & ADDITIONAL STUDIES:    ASSESSMENT/PLAN:  85yMale presenting with:    Neuro:    HEENT:    CV:    Pulm:    GI/Nutrition:    /Renal:    ID:    Lines/Tubes:    Endo:    Skin:    Proph:    Dispo:      CRITICAL CARE TIME SPENT: INTERVAL HPI/OVERNIGHT EVENTS/SUBJECTIVE: no issues overnight.    ICU Vital Signs Last 24 Hrs  T(C): 35.9 (10 Oct 2017 08:00), Max: 37.5 (09 Oct 2017 12:00)  T(F): 96.6 (10 Oct 2017 08:00), Max: 99.5 (09 Oct 2017 12:00)  HR: 80 (10 Oct 2017 11:00) (76 - 101)  BP: 105/59 (10 Oct 2017 04:00) (76/38 - 122/66)  BP(mean): 82 (10 Oct 2017 04:00) (52 - 82)  ABP: 122/49 (10 Oct 2017 11:00) (71/34 - 192/192)  ABP(mean): 70 (10 Oct 2017 11:00) (43 - 192)  RR: 21 (10 Oct 2017 11:00) (15 - 25)  SpO2: 89% (10 Oct 2017 11:00) (83% - 100%)      I&O's Detail    09 Oct 2017 07:01  -  10 Oct 2017 07:00  --------------------------------------------------------  IN:    Free Water: 600 mL    IV PiggyBack: 200 mL    Lactated Ringers IV Bolus: 500 mL    multiple electrolytes Injection Type 1: 2800 mL    Solution: 100 mL  Total IN: 4200 mL    OUT:    Indwelling Catheter - Urethral: 1170 mL  Total OUT: 1170 mL    Total NET: 3030 mL      10 Oct 2017 07:01  -  10 Oct 2017 11:36  --------------------------------------------------------  IN:    multiple electrolytes Injection Type 1: 500 mL    Pivot: 120 mL  Total IN: 620 mL    OUT:    Indwelling Catheter - Urethral: 205 mL  Total OUT: 205 mL    Total NET: 415 mL          Mode: AC/ CMV (Assist Control/ Continuous Mandatory Ventilation)  RR (machine): 14  TV (machine): 500  FiO2: 40  PEEP: 8  MAP: 15  PIP: 22    ABG - ( 10 Oct 2017 03:50 )  pH: 7.34  /  pCO2: 41    /  pO2: 117   / HCO3: 22    / Base Excess: -3.5  /  SaO2: 99                  MEDICATIONS  (STANDING):  chlorhexidine 0.12% Liquid 15 milliLiter(s) Swish and Spit two times a day  dextrose 5%. 1000 milliLiter(s) (50 mL/Hr) IV Continuous <Continuous>  dextrose 50% Injectable 12.5 Gram(s) IV Push once  dextrose 50% Injectable 25 Gram(s) IV Push once  dextrose 50% Injectable 25 Gram(s) IV Push once  insulin lispro (HumaLOG) corrective regimen sliding scale   SubCutaneous every 6 hours  levETIRAcetam  IVPB 500 milliGRAM(s) IV Intermittent every 12 hours  multiple electrolytes Injection Type 1 1000 milliLiter(s) (100 mL/Hr) IV Continuous <Continuous>  pantoprazole  Injectable 40 milliGRAM(s) IV Push daily    MEDICATIONS  (PRN):  dextrose Gel 1 Dose(s) Oral once PRN Blood Glucose LESS THAN 70 milliGRAM(s)/deciliter  glucagon  Injectable 1 milliGRAM(s) IntraMuscular once PRN Glucose LESS THAN 70 milligrams/deciliter          PHYSICAL EXAM:    Gen: Off sedation, NAD    Eyes: LOUISE    Neurological: GCS 7-8    ENMT: anicteric scleare    Neck: colalr in place, Non JVD    Pulmonary: CTAB    Cardiovascular: RRR    Gastrointestinal: soft non tender non disteded    Genitourinary: clear urine in santos    Back: intact    Extremities: no edema    Skin: intact    Musculoskeletal: no deformities.          LABS:  CBC Full  -  ( 10 Oct 2017 05:31 )  WBC Count : 6.6 K/uL  Hemoglobin : 10.9 g/dL  Hematocrit : 32.4 %  Platelet Count - Automated : 124 K/uL  Mean Cell Volume : 93.9 fl  Mean Cell Hemoglobin : 31.6 pg  Mean Cell Hemoglobin Concentration : 33.6 g/dL  Auto Neutrophil # : x  Auto Lymphocyte # : x  Auto Monocyte # : x  Auto Eosinophil # : x  Auto Basophil # : x  Auto Neutrophil % : 83.0 %  Auto Lymphocyte % : 8.0 %  Auto Monocyte % : 8.0 %  Auto Eosinophil % : x  Auto Basophil % : x    10-10    145  |  107  |  37.0<H>  ----------------------------<  121<H>  4.2   |  22.0  |  1.40<H>    Ca    7.9<L>      10 Oct 2017 05:31  Phos  3.2     10-10  Mg     2.2     10-10    TPro  5.2<L>  /  Alb  3.0<L>  /  TBili  1.2  /  DBili  x   /  AST  74<H>  /  ALT  41<H>  /  AlkPhos  50  10-08    PT/INR - ( 08 Oct 2017 20:09 )   PT: 15.2 sec;   INR: 1.37 ratio         PTT - ( 08 Oct 2017 14:22 )  PTT:34.8 sec    RECENT CULTURES:      LIVER FUNCTIONS - ( 08 Oct 2017 14:22 )  Alb: 3.0 g/dL / Pro: 5.2 g/dL / ALK PHOS: 50 U/L / ALT: 41 U/L / AST: 74 U/L / GGT: x           CARDIAC MARKERS ( 10 Oct 2017 05:31 )  x     / x     / 475 U/L / x     / 6.5 ng/mL  CARDIAC MARKERS ( 09 Oct 2017 05:47 )  x     / 0.05 ng/mL / 1491 U/L / x     / 12.9 ng/mL  CARDIAC MARKERS ( 08 Oct 2017 20:09 )  x     / x     / 1800 U/L / x     / 14.4 ng/mL  CARDIAC MARKERS ( 08 Oct 2017 17:30 )  x     / 0.06 ng/mL / 1783 U/L / x     / 13.8 ng/mL      CAPILLARY BLOOD GLUCOSE  121 (10 Oct 2017 06:00)  111 (10 Oct 2017 00:00)      RADIOLOGY & ADDITIONAL STUDIES:    ASSESSMENT/PLAN:  85yMale presenting with: Severe TBI, poor meaningful neurolocaly recovery    Neuro:    HEENT:    CV:    Pulm:    GI/Nutrition:    /Renal:    ID:    Lines/Tubes:    Endo:    Skin:    Proph:    Dispo:      CRITICAL CARE TIME SPENT: INTERVAL HPI/OVERNIGHT EVENTS/SUBJECTIVE: no issues overnight.    ICU Vital Signs Last 24 Hrs  T(C): 35.9 (10 Oct 2017 08:00), Max: 37.5 (09 Oct 2017 12:00)  T(F): 96.6 (10 Oct 2017 08:00), Max: 99.5 (09 Oct 2017 12:00)  HR: 80 (10 Oct 2017 11:00) (76 - 101)  BP: 105/59 (10 Oct 2017 04:00) (76/38 - 122/66)  BP(mean): 82 (10 Oct 2017 04:00) (52 - 82)  ABP: 122/49 (10 Oct 2017 11:00) (71/34 - 192/192)  ABP(mean): 70 (10 Oct 2017 11:00) (43 - 192)  RR: 21 (10 Oct 2017 11:00) (15 - 25)  SpO2: 89% (10 Oct 2017 11:00) (83% - 100%)      I&O's Detail    09 Oct 2017 07:01  -  10 Oct 2017 07:00  --------------------------------------------------------  IN:    Free Water: 600 mL    IV PiggyBack: 200 mL    Lactated Ringers IV Bolus: 500 mL    multiple electrolytes Injection Type 1: 2800 mL    Solution: 100 mL  Total IN: 4200 mL    OUT:    Indwelling Catheter - Urethral: 1170 mL  Total OUT: 1170 mL    Total NET: 3030 mL      10 Oct 2017 07:01  -  10 Oct 2017 11:36  --------------------------------------------------------  IN:    multiple electrolytes Injection Type 1: 500 mL    Pivot: 120 mL  Total IN: 620 mL    OUT:    Indwelling Catheter - Urethral: 205 mL  Total OUT: 205 mL    Total NET: 415 mL          Mode: AC/ CMV (Assist Control/ Continuous Mandatory Ventilation)  RR (machine): 14  TV (machine): 500  FiO2: 40  PEEP: 8  MAP: 15  PIP: 22    ABG - ( 10 Oct 2017 03:50 )  pH: 7.34  /  pCO2: 41    /  pO2: 117   / HCO3: 22    / Base Excess: -3.5  /  SaO2: 99                  MEDICATIONS  (STANDING):  chlorhexidine 0.12% Liquid 15 milliLiter(s) Swish and Spit two times a day  dextrose 5%. 1000 milliLiter(s) (50 mL/Hr) IV Continuous <Continuous>  dextrose 50% Injectable 12.5 Gram(s) IV Push once  dextrose 50% Injectable 25 Gram(s) IV Push once  dextrose 50% Injectable 25 Gram(s) IV Push once  insulin lispro (HumaLOG) corrective regimen sliding scale   SubCutaneous every 6 hours  levETIRAcetam  IVPB 500 milliGRAM(s) IV Intermittent every 12 hours  multiple electrolytes Injection Type 1 1000 milliLiter(s) (100 mL/Hr) IV Continuous <Continuous>  pantoprazole  Injectable 40 milliGRAM(s) IV Push daily    MEDICATIONS  (PRN):  dextrose Gel 1 Dose(s) Oral once PRN Blood Glucose LESS THAN 70 milliGRAM(s)/deciliter  glucagon  Injectable 1 milliGRAM(s) IntraMuscular once PRN Glucose LESS THAN 70 milligrams/deciliter          PHYSICAL EXAM:    Gen: Off sedation, NAD    Eyes: LOUISE    Neurological: GCS 7-8    ENMT: anicteric scleare    Neck: colalr in place, Non JVD    Pulmonary: CTAB    Cardiovascular: RRR    Gastrointestinal: soft non tender non disteded    Genitourinary: clear urine in santos    Back: intact    Extremities: no edema    Skin: intact    Musculoskeletal: no deformities.          LABS:  CBC Full  -  ( 10 Oct 2017 05:31 )  WBC Count : 6.6 K/uL  Hemoglobin : 10.9 g/dL  Hematocrit : 32.4 %  Platelet Count - Automated : 124 K/uL  Mean Cell Volume : 93.9 fl  Mean Cell Hemoglobin : 31.6 pg  Mean Cell Hemoglobin Concentration : 33.6 g/dL  Auto Neutrophil # : x  Auto Lymphocyte # : x  Auto Monocyte # : x  Auto Eosinophil # : x  Auto Basophil # : x  Auto Neutrophil % : 83.0 %  Auto Lymphocyte % : 8.0 %  Auto Monocyte % : 8.0 %  Auto Eosinophil % : x  Auto Basophil % : x    10-10    145  |  107  |  37.0<H>  ----------------------------<  121<H>  4.2   |  22.0  |  1.40<H>    Ca    7.9<L>      10 Oct 2017 05:31  Phos  3.2     10-10  Mg     2.2     10-10    TPro  5.2<L>  /  Alb  3.0<L>  /  TBili  1.2  /  DBili  x   /  AST  74<H>  /  ALT  41<H>  /  AlkPhos  50  10-08    PT/INR - ( 08 Oct 2017 20:09 )   PT: 15.2 sec;   INR: 1.37 ratio         PTT - ( 08 Oct 2017 14:22 )  PTT:34.8 sec    RECENT CULTURES:      LIVER FUNCTIONS - ( 08 Oct 2017 14:22 )  Alb: 3.0 g/dL / Pro: 5.2 g/dL / ALK PHOS: 50 U/L / ALT: 41 U/L / AST: 74 U/L / GGT: x           CARDIAC MARKERS ( 10 Oct 2017 05:31 )  x     / x     / 475 U/L / x     / 6.5 ng/mL  CARDIAC MARKERS ( 09 Oct 2017 05:47 )  x     / 0.05 ng/mL / 1491 U/L / x     / 12.9 ng/mL  CARDIAC MARKERS ( 08 Oct 2017 20:09 )  x     / x     / 1800 U/L / x     / 14.4 ng/mL  CARDIAC MARKERS ( 08 Oct 2017 17:30 )  x     / 0.06 ng/mL / 1783 U/L / x     / 13.8 ng/mL      CAPILLARY BLOOD GLUCOSE  121 (10 Oct 2017 06:00)  111 (10 Oct 2017 00:00)      RADIOLOGY & ADDITIONAL STUDIES:    ASSESSMENT/PLAN:  85yMale presenting with: Severe TBI, poor meaningful neurological recovery/    Neuro: Off sedation GCS 7-8, Poor meaningful neurological recovery, family meeting held. explained that it will be at least 12-18 months to asses new neurological baseline  offered Trach/peg: there is complex family dynamics and they will discuss among them.    CV: Perfusion is adequate appear euvolemic    Pulm: vent support, transitioned to PSV    GI/Nutrition: TF stated, now at goal.    /Renal: MEAGHAN improving, follow and replete electrolytes.    ID: no issues.    Endo: Glycemia at target    Skin: intact    Proph: Start Lovenox for dvt chemoprophylaxis    Dispo: ICU      CRITICAL CARE TIME SPENT: 38 minutes. INTERVAL HPI/OVERNIGHT EVENTS/SUBJECTIVE: no issues overnight.    ICU Vital Signs Last 24 Hrs  T(C): 35.9 (10 Oct 2017 08:00), Max: 37.5 (09 Oct 2017 12:00)  T(F): 96.6 (10 Oct 2017 08:00), Max: 99.5 (09 Oct 2017 12:00)  HR: 80 (10 Oct 2017 11:00) (76 - 101)  BP: 105/59 (10 Oct 2017 04:00) (76/38 - 122/66)  BP(mean): 82 (10 Oct 2017 04:00) (52 - 82)  ABP: 122/49 (10 Oct 2017 11:00) (71/34 - 192/192)  ABP(mean): 70 (10 Oct 2017 11:00) (43 - 192)  RR: 21 (10 Oct 2017 11:00) (15 - 25)  SpO2: 89% (10 Oct 2017 11:00) (83% - 100%)      I&O's Detail    09 Oct 2017 07:01  -  10 Oct 2017 07:00  --------------------------------------------------------  IN:    Free Water: 600 mL    IV PiggyBack: 200 mL    Lactated Ringers IV Bolus: 500 mL    multiple electrolytes Injection Type 1: 2800 mL    Solution: 100 mL  Total IN: 4200 mL    OUT:    Indwelling Catheter - Urethral: 1170 mL  Total OUT: 1170 mL    Total NET: 3030 mL      10 Oct 2017 07:01  -  10 Oct 2017 11:36  --------------------------------------------------------  IN:    multiple electrolytes Injection Type 1: 500 mL    Pivot: 120 mL  Total IN: 620 mL    OUT:    Indwelling Catheter - Urethral: 205 mL  Total OUT: 205 mL    Total NET: 415 mL          Mode: AC/ CMV (Assist Control/ Continuous Mandatory Ventilation)  RR (machine): 14  TV (machine): 500  FiO2: 40  PEEP: 8  MAP: 15  PIP: 22    ABG - ( 10 Oct 2017 03:50 )  pH: 7.34  /  pCO2: 41    /  pO2: 117   / HCO3: 22    / Base Excess: -3.5  /  SaO2: 99                  MEDICATIONS  (STANDING):  chlorhexidine 0.12% Liquid 15 milliLiter(s) Swish and Spit two times a day  dextrose 5%. 1000 milliLiter(s) (50 mL/Hr) IV Continuous <Continuous>  dextrose 50% Injectable 12.5 Gram(s) IV Push once  dextrose 50% Injectable 25 Gram(s) IV Push once  dextrose 50% Injectable 25 Gram(s) IV Push once  insulin lispro (HumaLOG) corrective regimen sliding scale   SubCutaneous every 6 hours  levETIRAcetam  IVPB 500 milliGRAM(s) IV Intermittent every 12 hours  multiple electrolytes Injection Type 1 1000 milliLiter(s) (100 mL/Hr) IV Continuous <Continuous>  pantoprazole  Injectable 40 milliGRAM(s) IV Push daily    MEDICATIONS  (PRN):  dextrose Gel 1 Dose(s) Oral once PRN Blood Glucose LESS THAN 70 milliGRAM(s)/deciliter  glucagon  Injectable 1 milliGRAM(s) IntraMuscular once PRN Glucose LESS THAN 70 milligrams/deciliter          PHYSICAL EXAM:    Gen: Off sedation, NAD    Eyes: LOUISE    Neurological: GCS 7-8    ENMT: anicteric scleare    Neck: colalr in place, Non JVD    Pulmonary: CTAB    Cardiovascular: RRR    Gastrointestinal: soft non tender non disteded    Genitourinary: clear urine in santos    Back: intact    Extremities: no edema    Skin: intact    Musculoskeletal: no deformities.          LABS:  CBC Full  -  ( 10 Oct 2017 05:31 )  WBC Count : 6.6 K/uL  Hemoglobin : 10.9 g/dL  Hematocrit : 32.4 %  Platelet Count - Automated : 124 K/uL  Mean Cell Volume : 93.9 fl  Mean Cell Hemoglobin : 31.6 pg  Mean Cell Hemoglobin Concentration : 33.6 g/dL  Auto Neutrophil # : x  Auto Lymphocyte # : x  Auto Monocyte # : x  Auto Eosinophil # : x  Auto Basophil # : x  Auto Neutrophil % : 83.0 %  Auto Lymphocyte % : 8.0 %  Auto Monocyte % : 8.0 %  Auto Eosinophil % : x  Auto Basophil % : x    10-10    145  |  107  |  37.0<H>  ----------------------------<  121<H>  4.2   |  22.0  |  1.40<H>    Ca    7.9<L>      10 Oct 2017 05:31  Phos  3.2     10-10  Mg     2.2     10-10    TPro  5.2<L>  /  Alb  3.0<L>  /  TBili  1.2  /  DBili  x   /  AST  74<H>  /  ALT  41<H>  /  AlkPhos  50  10-08    PT/INR - ( 08 Oct 2017 20:09 )   PT: 15.2 sec;   INR: 1.37 ratio         PTT - ( 08 Oct 2017 14:22 )  PTT:34.8 sec    RECENT CULTURES:      LIVER FUNCTIONS - ( 08 Oct 2017 14:22 )  Alb: 3.0 g/dL / Pro: 5.2 g/dL / ALK PHOS: 50 U/L / ALT: 41 U/L / AST: 74 U/L / GGT: x           CARDIAC MARKERS ( 10 Oct 2017 05:31 )  x     / x     / 475 U/L / x     / 6.5 ng/mL  CARDIAC MARKERS ( 09 Oct 2017 05:47 )  x     / 0.05 ng/mL / 1491 U/L / x     / 12.9 ng/mL  CARDIAC MARKERS ( 08 Oct 2017 20:09 )  x     / x     / 1800 U/L / x     / 14.4 ng/mL  CARDIAC MARKERS ( 08 Oct 2017 17:30 )  x     / 0.06 ng/mL / 1783 U/L / x     / 13.8 ng/mL      CAPILLARY BLOOD GLUCOSE  121 (10 Oct 2017 06:00)  111 (10 Oct 2017 00:00)      RADIOLOGY & ADDITIONAL STUDIES:    ASSESSMENT/PLAN:  85yMale presenting with: Severe TBI, poor meaningful neurological recovery/    Neuro: Off sedation GCS 7-8, Poor meaningful neurological recovery, family meeting held. explained that it will be at least 12-18 months to asses new neurological baseline  offered Trach/peg: there is complex family dynamics and they will discuss among them.    CV: Perfusion is adequate appear euvolemic    Pulm: vent support, transitioned to PSV    GI/Nutrition: TF stated, now at goal.    /Renal: MEAGHAN improving, follow and replete electrolytes.    ID: no issues.    Endo: Glycemia at target    Skin: intact    Proph: Start heparin for dvt chemoprophylaxis (MEAGHAN)    Dispo: ICU      CRITICAL CARE TIME SPENT: 38 minutes.

## 2017-10-10 NOTE — PROGRESS NOTE ADULT - SUBJECTIVE AND OBJECTIVE BOX
OVERNIGHT EVENTS:    BRIEF HOSPITAL COURSE:    Present Symptoms:     Dyspnea: 0 1 2 3   Nausea/Vomiting: Yes No  Anxiety:  Yes No  Depression: Yes No  Fatigue: Yes No  Loss of appetite: Yes No    Pain:             Character-            Duration-            Effect-            Factors-            Frequency-            Location-            Severity-    Review of Systems: Reviewed                     Negative:                     Positive:  Unable to obtain due to poor mentation   All others negative    MEDICATIONS  (STANDING):  chlorhexidine 0.12% Liquid 15 milliLiter(s) Swish and Spit two times a day  dextrose 5%. 1000 milliLiter(s) (50 mL/Hr) IV Continuous <Continuous>  dextrose 50% Injectable 12.5 Gram(s) IV Push once  dextrose 50% Injectable 25 Gram(s) IV Push once  dextrose 50% Injectable 25 Gram(s) IV Push once  insulin lispro (HumaLOG) corrective regimen sliding scale   SubCutaneous every 6 hours  levETIRAcetam  IVPB 500 milliGRAM(s) IV Intermittent every 12 hours  multiple electrolytes Injection Type 1 1000 milliLiter(s) (100 mL/Hr) IV Continuous <Continuous>  pantoprazole  Injectable 40 milliGRAM(s) IV Push daily    MEDICATIONS  (PRN):  dextrose Gel 1 Dose(s) Oral once PRN Blood Glucose LESS THAN 70 milliGRAM(s)/deciliter  glucagon  Injectable 1 milliGRAM(s) IntraMuscular once PRN Glucose LESS THAN 70 milligrams/deciliter      PHYSICAL EXAM:    Vital Signs Last 24 Hrs  T(C): 36.1 (10 Oct 2017 12:00), Max: 37.2 (09 Oct 2017 16:21)  T(F): 97 (10 Oct 2017 12:00), Max: 99 (09 Oct 2017 16:21)  HR: 82 (10 Oct 2017 14:00) (76 - 101)  BP: 105/59 (10 Oct 2017 04:00) (76/38 - 122/66)  BP(mean): 82 (10 Oct 2017 04:00) (52 - 82)  RR: 15 (10 Oct 2017 14:00) (11 - 24)  SpO2: 95% (10 Oct 2017 14:00) (83% - 100%)    General: alert  oriented x ____ lethargic agitated                  cachexia  nonverbal  coma    Karnofsky:  %    HEENT: normal  dry mouth  ET tube/trach    Lungs: comfortable tachypnea/labored breathing  excessive secretions    CV: normal  tachycardia    GI: normal  distended  tender  no BS               PEG/NG/OG tube  constipation  last BM:     : normal  incontinent  oliguria/anuria  santos    MSK: normal  weakness  edema             ambulatory  bedbound/wheelchair bound    Skin: normal  pressure ulcers- Stage_____  no rash    LABS:                          10.9   6.6   )-----------( 124      ( 10 Oct 2017 05:31 )             32.4     10-10    145  |  107  |  37.0<H>  ----------------------------<  121<H>  4.2   |  22.0  |  1.40<H>    Ca    7.9<L>      10 Oct 2017 05:31  Phos  3.2     10-10  Mg     2.2     10-10      PT/INR - ( 08 Oct 2017 20:09 )   PT: 15.2 sec;   INR: 1.37 ratio             I&O's Summary    09 Oct 2017 07:01  -  10 Oct 2017 07:00  --------------------------------------------------------  IN: 4200 mL / OUT: 1170 mL / NET: 3030 mL    10 Oct 2017 07:01  -  10 Oct 2017 14:46  --------------------------------------------------------  IN: 1310 mL / OUT: 345 mL / NET: 965 mL        RADIOLOGY & ADDITIONAL STUDIES:    ADVANCE DIRECTIVES:   DNR YES NO  Completed on:                     MOLST  YES NO   Completed on:  Living Will  YES NO   Completed on: OVERNIGHT EVENTS: no change, remains pretty much unresponsive, but moves spontaneously.    Present Symptoms:     Dyspnea: vented  Nausea/Vomiting: unable   Anxiety:  unable   Depression: unable   Fatigue: unable   Loss of appetite: unable     Pain: none             Character-            Duration-            Effect-            Factors-            Frequency-            Location-            Severity-    Review of Systems: Reviewed                   Unable to obtain due to poor mentation   All others negative    MEDICATIONS  (STANDING):  chlorhexidine 0.12% Liquid 15 milliLiter(s) Swish and Spit two times a day  dextrose 5%. 1000 milliLiter(s) (50 mL/Hr) IV Continuous <Continuous>  dextrose 50% Injectable 12.5 Gram(s) IV Push once  dextrose 50% Injectable 25 Gram(s) IV Push once  dextrose 50% Injectable 25 Gram(s) IV Push once  insulin lispro (HumaLOG) corrective regimen sliding scale   SubCutaneous every 6 hours  levETIRAcetam  IVPB 500 milliGRAM(s) IV Intermittent every 12 hours  multiple electrolytes Injection Type 1 1000 milliLiter(s) (100 mL/Hr) IV Continuous <Continuous>  pantoprazole  Injectable 40 milliGRAM(s) IV Push daily    MEDICATIONS  (PRN):  dextrose Gel 1 Dose(s) Oral once PRN Blood Glucose LESS THAN 70 milliGRAM(s)/deciliter  glucagon  Injectable 1 milliGRAM(s) IntraMuscular once PRN Glucose LESS THAN 70 milligrams/deciliter    PHYSICAL EXAM:    Vital Signs Last 24 Hrs  T(C): 36.1 (10 Oct 2017 12:00), Max: 37.2 (09 Oct 2017 16:21)  T(F): 97 (10 Oct 2017 12:00), Max: 99 (09 Oct 2017 16:21)  HR: 82 (10 Oct 2017 14:00) (76 - 101)  BP: 105/59 (10 Oct 2017 04:00) (76/38 - 122/66)  BP(mean): 82 (10 Oct 2017 04:00) (52 - 82)  RR: 15 (10 Oct 2017 14:00) (11 - 24)  SpO2: 95% (10 Oct 2017 14:00) (83% - 100%)    General: lethargic but moves spontaneously     Karnofsky:  20%    HEENT: normal      Lungs: comfortable; ET tube     CV: normal      GI: OG tube      : santos    MSK: weakness     Skin: no rash    LABS:                      10.9   6.6   )-----------( 124      ( 10 Oct 2017 05:31 )             32.4     10-10    145  |  107  |  37.0<H>  ----------------------------<  121<H>  4.2   |  22.0  |  1.40<H>    Ca    7.9<L>      10 Oct 2017 05:31  Phos  3.2     10-10  Mg     2.2     10-10    PT/INR - ( 08 Oct 2017 20:09 )   PT: 15.2 sec;   INR: 1.37 ratio      I&O's Summary    09 Oct 2017 07:01  -  10 Oct 2017 07:00  --------------------------------------------------------  IN: 4200 mL / OUT: 1170 mL / NET: 3030 mL    10 Oct 2017 07:01  -  10 Oct 2017 14:46  --------------------------------------------------------  IN: 1310 mL / OUT: 345 mL / NET: 965 mL    RADIOLOGY & ADDITIONAL STUDIES:    ADVANCE DIRECTIVES: Full Code

## 2017-10-10 NOTE — PROGRESS NOTE ADULT - SUBJECTIVE AND OBJECTIVE BOX
INTERVAL HPI/OVERNIGHT EVENTS:  85y Male s/p fall on Plavix found with multifocal ICH (diffuse SAH, diffuse frontal/temporal hemorrhagic contusions, IVH, interhemispheric, tentorium and L convexity SDH). Patient seen lying in bed, intubated, C-collar in place. Not sedated.     Vital Signs Last 24 Hrs  T(C): 35.9 (10 Oct 2017 08:00), Max: 37.5 (09 Oct 2017 12:00)  T(F): 96.6 (10 Oct 2017 08:00), Max: 99.5 (09 Oct 2017 12:00)  HR: 76 (10 Oct 2017 09:13) (76 - 101)  BP: 105/59 (10 Oct 2017 04:00) (76/38 - 122/66)  BP(mean): 82 (10 Oct 2017 04:00) (52 - 82)  RR: 19 (10 Oct 2017 09:00) (15 - 25)  SpO2: 95% (10 Oct 2017 09:13) (83% - 100%)    PHYSICAL EXAM:  GENERAL: NAD, intubated  HEAD:  +traumatic; normocephalic  MENTAL STATUS: Intubated, not sedated. Does not open eyes. Not following commands  CRANIAL NERVES: Pupils at first appeared anisocoric however with pupil assessment with light, both pupils appear equal ~3mm, and reactive to light briskly bilaterally. Corneal reflex intact b/l; does not blink to threat b/l; unable to assess facial sensation. No gag appreciated to my exam  MOTOR: Localizes b/l upper extremities. At times patient seen moving LUE spontaneously. Withdraws to noxious b/l lowers  CHEST/LUNG: Clear to auscultation bilaterally  HEART: +S1/+S2; Regular rate and rhythm  ABDOMEN: Soft, nontender, nondistended    LABS:                        10.9   6.6   )-----------( 124      ( 10 Oct 2017 05:31 )             32.4     10-10    145  |  107  |  37.0<H>  ----------------------------<  121<H>  4.2   |  22.0  |  1.40<H>    Ca    7.9<L>      10 Oct 2017 05:31  Phos  3.2     10-10  Mg     2.2     10-10    TPro  5.2<L>  /  Alb  3.0<L>  /  TBili  1.2  /  DBili  x   /  AST  74<H>  /  ALT  41<H>  /  AlkPhos  50  10-08    PT/INR - ( 08 Oct 2017 20:09 )   PT: 15.2 sec;   INR: 1.37 ratio      PTT - ( 08 Oct 2017 14:22 )  PTT:34.8 sec      10-09 @ 07:01  -  10-10 @ 07:00  --------------------------------------------------------  IN: 4200 mL / OUT: 1170 mL / NET: 3030 mL    10-10 @ 07:01  -  10-10 @ 09:35  --------------------------------------------------------  IN: 200 mL / OUT: 75 mL / NET: 125 mL    RADIOLOGY & ADDITIONAL TESTS:  - from: CT Cervical Spine No Cont (10.08.17 @ 22:01)  IMPRESSION: Linear oblique nondisplaced fracture of the right occipital   calvarium with underlying right posterior cerebellar hematoma, in   addition to diffuse posttraumatic hemorrhage in the visualized brain   parenchyma. Chronic multilevel degenerative cervical spondylosis. Blood   levels in the sphenoid sinuses bilaterally.    - from: CT Head No Cont (10.08.17 @ 22:01)  IMPRESSION:  Multifocal intracranial hemorrhage including diffuse subarachnoid   hemorrhage, diffuse hemorrhagic contusions in the frontal and temporal   lobes, intraventricular hemorrhage,, interhemispheric, tentorium and left   convexity subdural hematoma, no midline shift.  Fracture of right occipital calvarium and skull base associated   hemorrhagic contusion right cerebellum and hemorrhage within the sphenoid   sinuses bilaterally.

## 2017-10-11 NOTE — PROGRESS NOTE ADULT - SUBJECTIVE AND OBJECTIVE BOX
OVERNIGHT EVENTS: no significant changes. still vent dependent, with poor mental status     Present Symptoms:     Dyspnea: vented   Nausea/Vomiting: unable   Anxiety: unable   Depression unable   Fatigue: unable   Loss of appetite: unable     Pain: none             Character-            Duration-            Effect-            Factors-            Frequency-            Location-            Severity-    Review of Systems: Reviewed                 Unable to obtain due to poor mentation   All others negative    MEDICATIONS  (STANDING):  chlorhexidine 0.12% Liquid 15 milliLiter(s) Swish and Spit two times a day  dextrose 5%. 1000 milliLiter(s) (50 mL/Hr) IV Continuous <Continuous>  dextrose 50% Injectable 12.5 Gram(s) IV Push once  dextrose 50% Injectable 25 Gram(s) IV Push once  dextrose 50% Injectable 25 Gram(s) IV Push once  heparin  Injectable 5000 Unit(s) SubCutaneous every 8 hours  insulin lispro (HumaLOG) corrective regimen sliding scale   SubCutaneous every 6 hours  metoprolol Injectable 5 milliGRAM(s) IV Push once  multiple electrolytes Injection Type 1 1000 milliLiter(s) (100 mL/Hr) IV Continuous <Continuous>  pantoprazole  Injectable 40 milliGRAM(s) IV Push daily  sodium phosphate IVPB 15 milliMole(s) IV Intermittent once    MEDICATIONS  (PRN):  dextrose Gel 1 Dose(s) Oral once PRN Blood Glucose LESS THAN 70 milliGRAM(s)/deciliter  glucagon  Injectable 1 milliGRAM(s) IntraMuscular once PRN Glucose LESS THAN 70 milligrams/deciliter      PHYSICAL EXAM:    Vital Signs Last 24 Hrs  T(C): 37.5 (11 Oct 2017 19:00), Max: 37.6 (11 Oct 2017 16:00)  T(F): 99.5 (11 Oct 2017 19:00), Max: 99.7 (11 Oct 2017 16:00)  HR: 95 (11 Oct 2017 19:00) (51 - 147)  BP: 92/58 (11 Oct 2017 19:00) (78/53 - 173/99)  BP(mean): 70 (11 Oct 2017 19:00) (61 - 128)  RR: 23 (11 Oct 2017 19:00) (16 - 37)  SpO2: 100% (11 Oct 2017 19:00) (87% - 100%)    General: lethargic moves spontaneously not to commands                    Karnofsky: 20 %    HEENT: ET tube    Lungs: comfortable     CV: normal  tachycardia    GI: og tube     :  sanots    MSK: weakness     Skin:  no rash    LABS:                     10.9   6.7   )-----------( 121      ( 11 Oct 2017 15:58 )             33.1     10-11    144  |  106  |  30.0<H>  ----------------------------<  177<H>  3.7   |  29.0  |  0.75    Ca    7.8<L>      11 Oct 2017 15:58  Phos  1.5     10-11  Mg     2.1     10-11    TPro  5.3<L>  /  Alb  2.6<L>  /  TBili  0.6  /  DBili  x   /  AST  33  /  ALT  26  /  AlkPhos  55  10-11    I&O's Summary    10 Oct 2017 07:01  -  11 Oct 2017 07:00  --------------------------------------------------------  IN: 5215 mL / OUT: 1145 mL / NET: 4070 mL    11 Oct 2017 07:01  -  11 Oct 2017 19:39  --------------------------------------------------------  IN: 4282.5 mL / OUT: 530 mL / NET: 3752.5 mL    RADIOLOGY & ADDITIONAL STUDIES:    ADVANCE DIRECTIVES: DNR after this encounter

## 2017-10-11 NOTE — CHART NOTE - NSCHARTNOTEFT_GEN_A_CORE
Per daughter in law Danya, they are waiting for other children to come from out of town to have a family meeting priro to making serious decisions.

## 2017-10-11 NOTE — CHART NOTE - NSCHARTNOTEFT_GEN_A_CORE
Asked my pt's daughter Danya to remove cervical collar for pt's comfort.  Explained to pt's daughter that althought CT scan of neck is negative, an MRI has not yet been performed to r/o ligamentous injury and if pt were to have an injury and move without the collar in place, he could become paralyzed.  Pt verbalized understanding and still wishes collar to be removed.  Palliative care involved, actively discussing comfort care vs withdrawal of care.  Collar removed per daughters request.

## 2017-10-11 NOTE — PROGRESS NOTE ADULT - SUBJECTIVE AND OBJECTIVE BOX
INTERVAL HPI/OVERNIGHT EVENTS/SUBJECTIVE:    ICU Vital Signs Last 24 Hrs  T(C): 36.7 (11 Oct 2017 04:00), Max: 37 (11 Oct 2017 00:00)  T(F): 98.1 (11 Oct 2017 04:00), Max: 98.6 (11 Oct 2017 00:00)  HR: 96 (11 Oct 2017 07:00) (72 - 104)  BP: --  BP(mean): --  ABP: 128/57 (11 Oct 2017 07:00) (100/55 - 138/70)  ABP(mean): 77 (11 Oct 2017 07:00) (60 - 96)  RR: 23 (11 Oct 2017 07:00) (11 - 30)  SpO2: 100% (11 Oct 2017 07:00) (87% - 100%)      I&O's Detail    10 Oct 2017 07:01  -  11 Oct 2017 07:00  --------------------------------------------------------  IN:    Free Water: 1200 mL    IV PiggyBack: 100 mL    multiple electrolytes Injection Type 1: 2400 mL    Pivot: 1290 mL    Solution: 125 mL    Solution: 100 mL  Total IN: 5215 mL    OUT:    Indwelling Catheter - Urethral: 1145 mL  Total OUT: 1145 mL    Total NET: 4070 mL          Mode: CPAP with PS  FiO2: 40  PEEP: 8  PS: 14  MAP: 14    ABG - ( 11 Oct 2017 03:57 )  pH: 7.44  /  pCO2: 38    /  pO2: 60    / HCO3: 26    / Base Excess: 1.8   /  SaO2: 92                  MEDICATIONS  (STANDING):  acetaminophen    Suspension. 650 milliGRAM(s) Enteral Tube every 6 hours  chlorhexidine 0.12% Liquid 15 milliLiter(s) Swish and Spit two times a day  dextrose 5%. 1000 milliLiter(s) (50 mL/Hr) IV Continuous <Continuous>  dextrose 50% Injectable 12.5 Gram(s) IV Push once  dextrose 50% Injectable 25 Gram(s) IV Push once  dextrose 50% Injectable 25 Gram(s) IV Push once  heparin  Injectable 5000 Unit(s) SubCutaneous every 8 hours  insulin lispro (HumaLOG) corrective regimen sliding scale   SubCutaneous every 6 hours  levETIRAcetam  IVPB 500 milliGRAM(s) IV Intermittent every 12 hours  multiple electrolytes Injection Type 1 1000 milliLiter(s) (100 mL/Hr) IV Continuous <Continuous>  pantoprazole  Injectable 40 milliGRAM(s) IV Push daily  potassium phosphate IVPB 15 milliMole(s) IV Intermittent once    MEDICATIONS  (PRN):  dextrose Gel 1 Dose(s) Oral once PRN Blood Glucose LESS THAN 70 milliGRAM(s)/deciliter  glucagon  Injectable 1 milliGRAM(s) IntraMuscular once PRN Glucose LESS THAN 70 milligrams/deciliter      NUTRITION/IVF:     CENTRAL LINE:  LOCATION:   DATE INSERTED:  CVP:  SCVO2:    HOYOS:   DATE INSERTED:    A-LINE:    LOCATION:   DATE INSERTED:   SVV:  CO/CI:     CHEST TUBE:  LOCATION:  DATE INSERTED: OUTPUT/24 HRS:  SUCTION/WATER SEAL:     NG/OG TUBE:  DATE INSERTED:  OUTPUT/24 HRS:    MISC:     PHYSICAL EXAM:    Gen:    Eyes:    Neurological:    ENMT:    Neck:    Pulmonary:    Cardiovascular:    Gastrointestinal:    Genitourinary:    Back:    Extremities:    Skin:    Musculoskeletal:          LABS:  CBC Full  -  ( 11 Oct 2017 05:08 )  WBC Count : 6.1 K/uL  Hemoglobin : 11.1 g/dL  Hematocrit : 33.2 %  Platelet Count - Automated : 121 K/uL  Mean Cell Volume : 93.0 fl  Mean Cell Hemoglobin : 31.1 pg  Mean Cell Hemoglobin Concentration : 33.4 g/dL  Auto Neutrophil # : 4.7 K/uL  Auto Lymphocyte # : 0.5 K/uL  Auto Monocyte # : 0.8 K/uL  Auto Eosinophil # : 0.0 K/uL  Auto Basophil # : x  Auto Neutrophil % : 77.2 %  Auto Lymphocyte % : 8.8 %  Auto Monocyte % : 13.4 %  Auto Eosinophil % : 0.3 %  Auto Basophil % : x    10-11    141  |  106  |  31.0<H>  ----------------------------<  185<H>  3.7   |  24.0  |  0.79    Ca    7.9<L>      11 Oct 2017 05:08  Phos  1.0     10-11  Mg     2.1     10-11          RECENT CULTURES:        CARDIAC MARKERS ( 10 Oct 2017 05:31 )  x     / x     / 475 U/L / x     / 6.5 ng/mL      CAPILLARY BLOOD GLUCOSE  167 (11 Oct 2017 05:00)  154 (11 Oct 2017 00:00)  161 (10 Oct 2017 17:00)  130 (10 Oct 2017 12:00)      RADIOLOGY & ADDITIONAL STUDIES:    ASSESSMENT/PLAN:  85yMale presenting with:    Neuro:    HEENT:    CV:    Pulm:    GI/Nutrition:    /Renal:    ID:    Lines/Tubes:    Endo:    Skin:    Proph:    Dispo:      CRITICAL CARE TIME SPENT: INTERVAL HPI/OVERNIGHT EVENTS/SUBJECTIVE: Still no family consensus on goals of care. no clinical change    ICU Vital Signs Last 24 Hrs  T(C): 36.7 (11 Oct 2017 04:00), Max: 37 (11 Oct 2017 00:00)  T(F): 98.1 (11 Oct 2017 04:00), Max: 98.6 (11 Oct 2017 00:00)  HR: 96 (11 Oct 2017 07:00) (72 - 104)  BP: --  BP(mean): --  ABP: 128/57 (11 Oct 2017 07:00) (100/55 - 138/70)  ABP(mean): 77 (11 Oct 2017 07:00) (60 - 96)  RR: 23 (11 Oct 2017 07:00) (11 - 30)  SpO2: 100% (11 Oct 2017 07:00) (87% - 100%)      I&O's Detail    10 Oct 2017 07:01  -  11 Oct 2017 07:00  --------------------------------------------------------  IN:    Free Water: 1200 mL    IV PiggyBack: 100 mL    multiple electrolytes Injection Type 1: 2400 mL    Pivot: 1290 mL    Solution: 125 mL    Solution: 100 mL  Total IN: 5215 mL    OUT:    Indwelling Catheter - Urethral: 1145 mL  Total OUT: 1145 mL    Total NET: 4070 mL          Mode: CPAP with PS  FiO2: 40  PEEP: 8  PS: 14  MAP: 14    ABG - ( 11 Oct 2017 03:57 )  pH: 7.44  /  pCO2: 38    /  pO2: 60    / HCO3: 26    / Base Excess: 1.8   /  SaO2: 92                                  11.1   6.1   )-----------( 121      ( 11 Oct 2017 05:08 )             33.2   10-11    141  |  106  |  31.0<H>  ----------------------------<  185<H>  3.7   |  24.0  |  0.79    Ca    7.9<L>      11 Oct 2017 05:08  Phos  1.0     10-11  Mg     2.1     10-11          MEDICATIONS  (STANDING):  acetaminophen    Suspension. 650 milliGRAM(s) Enteral Tube every 6 hours  chlorhexidine 0.12% Liquid 15 milliLiter(s) Swish and Spit two times a day  dextrose 5%. 1000 milliLiter(s) (50 mL/Hr) IV Continuous <Continuous>  dextrose 50% Injectable 12.5 Gram(s) IV Push once  dextrose 50% Injectable 25 Gram(s) IV Push once  dextrose 50% Injectable 25 Gram(s) IV Push once  heparin  Injectable 5000 Unit(s) SubCutaneous every 8 hours  insulin lispro (HumaLOG) corrective regimen sliding scale   SubCutaneous every 6 hours  levETIRAcetam  IVPB 500 milliGRAM(s) IV Intermittent every 12 hours  multiple electrolytes Injection Type 1 1000 milliLiter(s) (100 mL/Hr) IV Continuous <Continuous>  pantoprazole  Injectable 40 milliGRAM(s) IV Push daily  potassium phosphate IVPB 15 milliMole(s) IV Intermittent once    MEDICATIONS  (PRN):  dextrose Gel 1 Dose(s) Oral once PRN Blood Glucose LESS THAN 70 milliGRAM(s)/deciliter  glucagon  Injectable 1 milliGRAM(s) IntraMuscular once PRN Glucose LESS THAN 70 milligrams/deciliter          PHYSICAL EXAM:    Gen: NAD    Eyes: LOUISE    Neurological: GCS 7-8 JEFFREY, mon focal    ENMT: anicteric scleare    Neck: no JVD    Pulmonary: CTA    Cardiovascular: S1 and S2    Gastrointestinal: soft non tender non distended.    Genitourinary: clear urine in santos    Extremities: no edema, warm to touch    Skin: intact     Musculoskeletal: no deformities.          LABS:  CBC Full  -  ( 11 Oct 2017 05:08 )  WBC Count : 6.1 K/uL  Hemoglobin : 11.1 g/dL  Hematocrit : 33.2 %  Platelet Count - Automated : 121 K/uL  Mean Cell Volume : 93.0 fl  Mean Cell Hemoglobin : 31.1 pg  Mean Cell Hemoglobin Concentration : 33.4 g/dL  Auto Neutrophil # : 4.7 K/uL  Auto Lymphocyte # : 0.5 K/uL  Auto Monocyte # : 0.8 K/uL  Auto Eosinophil # : 0.0 K/uL  Auto Basophil # : x  Auto Neutrophil % : 77.2 %  Auto Lymphocyte % : 8.8 %  Auto Monocyte % : 13.4 %  Auto Eosinophil % : 0.3 %  Auto Basophil % : x    10-11    141  |  106  |  31.0<H>  ----------------------------<  185<H>  3.7   |  24.0  |  0.79    Ca    7.9<L>      11 Oct 2017 05:08  Phos  1.0     10-11  Mg     2.1     10-11          RECENT CULTURES:        CARDIAC MARKERS ( 10 Oct 2017 05:31 )  x     / x     / 475 U/L / x     / 6.5 ng/mL      CAPILLARY BLOOD GLUCOSE  167 (11 Oct 2017 05:00)  154 (11 Oct 2017 00:00)  161 (10 Oct 2017 17:00)  130 (10 Oct 2017 12:00)      RADIOLOGY & ADDITIONAL STUDIES:    ASSESSMENT/PLAN:  85yMale presenting with: Severe TBI.    Neuro: [place], analgesia.    HEENT: no isses    CV: Perfusion is adeaute.    Pulm: vebnt support on PSV, oxygenation and ventilation adequate. requires tracheostomy, no family conscensis on goals of care    GI/Nutrition: needs PEG. TF at goal    /Renal: No issues    ID: no issues.    Endo: Glycemia target 160 +/- 20. RISS    Skin: intact    Proph: heparin    Dispo: ICU      CRITICAL CARE TIME SPENT: 31 minutes

## 2017-10-12 NOTE — PROGRESS NOTE ADULT - SUBJECTIVE AND OBJECTIVE BOX
OVERNIGHT EVENTS:    Present Symptoms:     Dyspnea: 0 1 2 3   Nausea/Vomiting: Yes No  Anxiety:  Yes No  Depression: Yes No  Fatigue: Yes No  Loss of appetite: Yes No    Pain:             Character-            Duration-            Effect-            Factors-            Frequency-            Location-            Severity-    Review of Systems: Reviewed                     Negative:                     Positive:  Unable to obtain due to poor mentation   All others negative    MEDICATIONS  (STANDING):  chlorhexidine 0.12% Liquid 15 milliLiter(s) Swish and Spit two times a day  dextrose 5%. 1000 milliLiter(s) (50 mL/Hr) IV Continuous <Continuous>  dextrose 50% Injectable 12.5 Gram(s) IV Push once  dextrose 50% Injectable 25 Gram(s) IV Push once  dextrose 50% Injectable 25 Gram(s) IV Push once  diltiazem Injectable 10 milliGRAM(s) IV Push once  heparin  Injectable 5000 Unit(s) SubCutaneous every 8 hours  insulin lispro (HumaLOG) corrective regimen sliding scale   SubCutaneous every 6 hours  multiple electrolytes Injection Type 1 1000 milliLiter(s) (100 mL/Hr) IV Continuous <Continuous>  pantoprazole  Injectable 40 milliGRAM(s) IV Push daily    MEDICATIONS  (PRN):  dextrose Gel 1 Dose(s) Oral once PRN Blood Glucose LESS THAN 70 milliGRAM(s)/deciliter  glucagon  Injectable 1 milliGRAM(s) IntraMuscular once PRN Glucose LESS THAN 70 milligrams/deciliter    PHYSICAL EXAM:    Vital Signs Last 24 Hrs  T(C): 36.4 (12 Oct 2017 08:00), Max: 37.9 (12 Oct 2017 00:00)  T(F): 97.6 (12 Oct 2017 08:00), Max: 100.2 (12 Oct 2017 00:00)  HR: 103 (12 Oct 2017 12:00) (51 - 147)  BP: 107/57 (12 Oct 2017 12:00) (78/53 - 173/99)  BP(mean): 77 (12 Oct 2017 12:00) (61 - 128)  RR: 29 (12 Oct 2017 12:00) (20 - 37)  SpO2: 98% (12 Oct 2017 12:00) (93% - 100%)    General: alert  oriented x ____ lethargic agitated                  cachexia  nonverbal  coma    Karnofsky:  %    HEENT: normal  dry mouth  ET tube/trach    Lungs: comfortable tachypnea/labored breathing  excessive secretions    CV: normal  tachycardia    GI: normal  distended  tender  no BS               PEG/NG/OG tube  constipation  last BM:     : normal  incontinent  oliguria/anuria  santos    MSK: normal  weakness  edema             ambulatory  bedbound/wheelchair bound    Skin: normal  pressure ulcers- Stage_____  no rash    LABS:                      10.7   7.1   )-----------( 125      ( 12 Oct 2017 04:08 )             32.3     10-12    142  |  106  |  30.0<H>  ----------------------------<  145<H>  3.6   |  27.0  |  0.67    Ca    7.7<L>      12 Oct 2017 04:08  Phos  1.8     10-12  Mg     2.0     10-12    TPro  5.3<L>  /  Alb  2.6<L>  /  TBili  0.6  /  DBili  x   /  AST  33  /  ALT  26  /  AlkPhos  55  10-11        I&O's Summary    11 Oct 2017 07:01  -  12 Oct 2017 07:00  --------------------------------------------------------  IN: 7115 mL / OUT: 1205 mL / NET: 5910 mL    12 Oct 2017 07:01  -  12 Oct 2017 12:47  --------------------------------------------------------  IN: 965 mL / OUT: 350 mL / NET: 615 mL        RADIOLOGY & ADDITIONAL STUDIES:    ADVANCE DIRECTIVES:   DNR YES NO  Completed on:                     MOLST  YES NO   Completed on:  Living Will  YES NO   Completed on: OVERNIGHT EVENTS: doing about the same. remains vent dependent     Present Symptoms:     Dyspnea: vented   Nausea/Vomiting: unable   Anxiety:  unable   Depression: unable   Fatigue: unable   Loss of appetite: unable     Pain: none             Character-            Duration-            Effect-            Factors-            Frequency-            Location-            Severity-    Review of Systems: Reviewed                 Unable to obtain due to poor mentation   All others negative    MEDICATIONS  (STANDING):  chlorhexidine 0.12% Liquid 15 milliLiter(s) Swish and Spit two times a day  dextrose 5%. 1000 milliLiter(s) (50 mL/Hr) IV Continuous <Continuous>  dextrose 50% Injectable 12.5 Gram(s) IV Push once  dextrose 50% Injectable 25 Gram(s) IV Push once  dextrose 50% Injectable 25 Gram(s) IV Push once  diltiazem Injectable 10 milliGRAM(s) IV Push once  heparin  Injectable 5000 Unit(s) SubCutaneous every 8 hours  insulin lispro (HumaLOG) corrective regimen sliding scale   SubCutaneous every 6 hours  multiple electrolytes Injection Type 1 1000 milliLiter(s) (100 mL/Hr) IV Continuous <Continuous>  pantoprazole  Injectable 40 milliGRAM(s) IV Push daily    MEDICATIONS  (PRN):  dextrose Gel 1 Dose(s) Oral once PRN Blood Glucose LESS THAN 70 milliGRAM(s)/deciliter  glucagon  Injectable 1 milliGRAM(s) IntraMuscular once PRN Glucose LESS THAN 70 milligrams/deciliter    PHYSICAL EXAM:    Vital Signs Last 24 Hrs  T(C): 36.4 (12 Oct 2017 08:00), Max: 37.9 (12 Oct 2017 00:00)  T(F): 97.6 (12 Oct 2017 08:00), Max: 100.2 (12 Oct 2017 00:00)  HR: 103 (12 Oct 2017 12:00) (51 - 147)  BP: 107/57 (12 Oct 2017 12:00) (78/53 - 173/99)  BP(mean): 77 (12 Oct 2017 12:00) (61 - 128)  RR: 29 (12 Oct 2017 12:00) (20 - 37)  SpO2: 98% (12 Oct 2017 12:00) (93% - 100%)    General: intubated    Karnofsky:  20-30%    HEENT: ET tube     Lungs: comfortable    CV: normal      GI: OG tube     : santos    MSK: weakness      Skin: no rash    LABS:                      10.7   7.1   )-----------( 125      ( 12 Oct 2017 04:08 )             32.3   10-12    142  |  106  |  30.0<H>  ----------------------------<  145<H>  3.6   |  27.0  |  0.67    Ca    7.7<L>      12 Oct 2017 04:08  Phos  1.8     10-12  Mg     2.0     10-12    TPro  5.3<L>  /  Alb  2.6<L>  /  TBili  0.6  /  DBili  x   /  AST  33  /  ALT  26  /  AlkPhos  55  10-11    I&O's Summary    11 Oct 2017 07:01  -  12 Oct 2017 07:00  --------------------------------------------------------  IN: 7115 mL / OUT: 1205 mL / NET: 5910 mL    12 Oct 2017 07:01  -  12 Oct 2017 12:47  --------------------------------------------------------  IN: 965 mL / OUT: 350 mL / NET: 615 mL    RADIOLOGY & ADDITIONAL STUDIES:    ADVANCE DIRECTIVES: DNR

## 2017-10-12 NOTE — CONSULT NOTE ADULT - PROBLEM SELECTOR RECOMMENDATION 9
continue santos    Expect minimal bleeding through his foreskin    will sign off.
-no neurosurgical intervention, poor overall prognosis for neurological, and /or functional recovery ...

## 2017-10-12 NOTE — PROGRESS NOTE ADULT - SUBJECTIVE AND OBJECTIVE BOX
INTERVAL HPI/OVERNIGHT EVENTS/SUBJECTIVE:    ICU Vital Signs Last 24 Hrs  T(C): 36.4 (12 Oct 2017 08:00), Max: 37.9 (12 Oct 2017 00:00)  T(F): 97.6 (12 Oct 2017 08:00), Max: 100.2 (12 Oct 2017 00:00)  HR: 103 (12 Oct 2017 12:00) (51 - 147)  BP: 107/57 (12 Oct 2017 12:00) (78/53 - 173/99)  BP(mean): 77 (12 Oct 2017 12:00) (61 - 128)  ABP: 132/58 (11 Oct 2017 14:00) (102/66 - 132/58)  ABP(mean): 80 (11 Oct 2017 14:00) (80 - 82)  RR: 29 (12 Oct 2017 12:00) (20 - 37)  SpO2: 98% (12 Oct 2017 12:00) (93% - 100%)      I&O's Detail    11 Oct 2017 07:01  -  12 Oct 2017 07:00  --------------------------------------------------------  IN:    0.9% NaCl: 1000 mL    Free Water: 1300 mL    multiple electrolytes Injection Type 1: 2400 mL    Pivot: 1440 mL    Solution: 500 mL    Solution: 375 mL    Solution: 100 mL  Total IN: 7115 mL    OUT:    Indwelling Catheter - Urethral: 1205 mL  Total OUT: 1205 mL    Total NET: 5910 mL      12 Oct 2017 07:01  -  12 Oct 2017 12:38  --------------------------------------------------------  IN:    Free Water: 200 mL    multiple electrolytes Injection Type 1: 400 mL    Pivot: 240 mL    Solution: 125 mL  Total IN: 965 mL    OUT:    Indwelling Catheter - Urethral: 350 mL  Total OUT: 350 mL    Total NET: 615 mL          Mode: CPAP with PS  FiO2: 40  PEEP: 8  PS: 14  MAP: 14    ABG - ( 12 Oct 2017 04:31 )  pH: 7.47  /  pCO2: 37    /  pO2: 65    / HCO3: 27    / Base Excess: 3.5   /  SaO2: 94                  MEDICATIONS  (STANDING):  chlorhexidine 0.12% Liquid 15 milliLiter(s) Swish and Spit two times a day  dextrose 5%. 1000 milliLiter(s) (50 mL/Hr) IV Continuous <Continuous>  dextrose 50% Injectable 12.5 Gram(s) IV Push once  dextrose 50% Injectable 25 Gram(s) IV Push once  dextrose 50% Injectable 25 Gram(s) IV Push once  diltiazem Injectable 10 milliGRAM(s) IV Push once  heparin  Injectable 5000 Unit(s) SubCutaneous every 8 hours  insulin lispro (HumaLOG) corrective regimen sliding scale   SubCutaneous every 6 hours  multiple electrolytes Injection Type 1 1000 milliLiter(s) (100 mL/Hr) IV Continuous <Continuous>  pantoprazole  Injectable 40 milliGRAM(s) IV Push daily    MEDICATIONS  (PRN):  dextrose Gel 1 Dose(s) Oral once PRN Blood Glucose LESS THAN 70 milliGRAM(s)/deciliter  glucagon  Injectable 1 milliGRAM(s) IntraMuscular once PRN Glucose LESS THAN 70 milligrams/deciliter      NUTRITION/IVF:     CENTRAL LINE:  LOCATION:   DATE INSERTED:  CVP:  SCVO2:    HOYOS:   DATE INSERTED:    A-LINE:    LOCATION:   DATE INSERTED:   SVV:  CO/CI:     CHEST TUBE:  LOCATION:  DATE INSERTED: OUTPUT/24 HRS:  SUCTION/WATER SEAL:     NG/OG TUBE:  DATE INSERTED:  OUTPUT/24 HRS:    MISC:     PHYSICAL EXAM:    Gen:    Eyes:    Neurological:    ENMT:    Neck:    Pulmonary:    Cardiovascular:    Gastrointestinal:    Genitourinary:    Back:    Extremities:    Skin:    Musculoskeletal:          LABS:  CBC Full  -  ( 12 Oct 2017 04:08 )  WBC Count : 7.1 K/uL  Hemoglobin : 10.7 g/dL  Hematocrit : 32.3 %  Platelet Count - Automated : 125 K/uL  Mean Cell Volume : 92.0 fl  Mean Cell Hemoglobin : 30.5 pg  Mean Cell Hemoglobin Concentration : 33.1 g/dL  Auto Neutrophil # : 5.3 K/uL  Auto Lymphocyte # : 0.6 K/uL  Auto Monocyte # : 1.2 K/uL  Auto Eosinophil # : 0.0 K/uL  Auto Basophil # : x  Auto Neutrophil % : 74.5 %  Auto Lymphocyte % : 7.9 %  Auto Monocyte % : 16.9 %  Auto Eosinophil % : 0.1 %  Auto Basophil % : x    10-12    142  |  106  |  30.0<H>  ----------------------------<  145<H>  3.6   |  27.0  |  0.67    Ca    7.7<L>      12 Oct 2017 04:08  Phos  1.8     10-12  Mg     2.0     10-12    TPro  5.3<L>  /  Alb  2.6<L>  /  TBili  0.6  /  DBili  x   /  AST  33  /  ALT  26  /  AlkPhos  55  10-11        RECENT CULTURES:      LIVER FUNCTIONS - ( 11 Oct 2017 15:58 )  Alb: 2.6 g/dL / Pro: 5.3 g/dL / ALK PHOS: 55 U/L / ALT: 26 U/L / AST: 33 U/L / GGT: x               CAPILLARY BLOOD GLUCOSE  130 (12 Oct 2017 12:00)  180 (12 Oct 2017 06:00)  141 (12 Oct 2017 00:00)  140 (11 Oct 2017 18:00)      RADIOLOGY & ADDITIONAL STUDIES:    ASSESSMENT/PLAN:  84yMale presenting with:    Neuro:    HEENT:    CV:    Pulm:    GI/Nutrition:    /Renal:    ID:    Lines/Tubes:    Endo:    Skin:    Proph:    Dispo:      CRITICAL CARE TIME SPENT: INTERVAL HPI/OVERNIGHT EVENTS/SUBJECTIVE: Family has decide for no escalation of care. no overnight issues.    ICU Vital Signs Last 24 Hrs  T(C): 36.4 (12 Oct 2017 08:00), Max: 37.9 (12 Oct 2017 00:00)  T(F): 97.6 (12 Oct 2017 08:00), Max: 100.2 (12 Oct 2017 00:00)  HR: 103 (12 Oct 2017 12:00) (51 - 147)  BP: 107/57 (12 Oct 2017 12:00) (78/53 - 173/99)  BP(mean): 77 (12 Oct 2017 12:00) (61 - 128)  ABP: 132/58 (11 Oct 2017 14:00) (102/66 - 132/58)  ABP(mean): 80 (11 Oct 2017 14:00) (80 - 82)  RR: 29 (12 Oct 2017 12:00) (20 - 37)  SpO2: 98% (12 Oct 2017 12:00) (93% - 100%)      I&O's Detail    11 Oct 2017 07:01  -  12 Oct 2017 07:00  --------------------------------------------------------  IN:    0.9% NaCl: 1000 mL    Free Water: 1300 mL    multiple electrolytes Injection Type 1: 2400 mL    Pivot: 1440 mL    Solution: 500 mL    Solution: 375 mL    Solution: 100 mL  Total IN: 7115 mL    OUT:    Indwelling Catheter - Urethral: 1205 mL  Total OUT: 1205 mL    Total NET: 5910 mL      12 Oct 2017 07:01  -  12 Oct 2017 12:38  --------------------------------------------------------  IN:    Free Water: 200 mL    multiple electrolytes Injection Type 1: 400 mL    Pivot: 240 mL    Solution: 125 mL  Total IN: 965 mL    OUT:    Indwelling Catheter - Urethral: 350 mL  Total OUT: 350 mL    Total NET: 615 mL          Mode: CPAP with PS  FiO2: 40  PEEP: 8  PS: 14  MAP: 14    ABG - ( 12 Oct 2017 04:31 )  pH: 7.47  /  pCO2: 37    /  pO2: 65    / HCO3: 27    / Base Excess: 3.5   /  SaO2: 94                  MEDICATIONS  (STANDING):  chlorhexidine 0.12% Liquid 15 milliLiter(s) Swish and Spit two times a day  dextrose 5%. 1000 milliLiter(s) (50 mL/Hr) IV Continuous <Continuous>  dextrose 50% Injectable 12.5 Gram(s) IV Push once  dextrose 50% Injectable 25 Gram(s) IV Push once  dextrose 50% Injectable 25 Gram(s) IV Push once  diltiazem Injectable 10 milliGRAM(s) IV Push once  heparin  Injectable 5000 Unit(s) SubCutaneous every 8 hours  insulin lispro (HumaLOG) corrective regimen sliding scale   SubCutaneous every 6 hours  multiple electrolytes Injection Type 1 1000 milliLiter(s) (100 mL/Hr) IV Continuous <Continuous>  pantoprazole  Injectable 40 milliGRAM(s) IV Push daily    MEDICATIONS  (PRN):  dextrose Gel 1 Dose(s) Oral once PRN Blood Glucose LESS THAN 70 milliGRAM(s)/deciliter  glucagon  Injectable 1 milliGRAM(s) IntraMuscular once PRN Glucose LESS THAN 70 milligrams/deciliter          PHYSICAL EXAM:    Gen: NAD    Eyes: NAIF    Neurological: GCS 7-8 non focal.    ENMT: anicteric sclerae    Neck: No JVD    Pulmonary: coarse with bibasilar rales.    Cardiovascular: RRR    Gastrointestinal: soft non tender, no distemed    Genitourinary: paraphimosis, unable to reduce it on rounds, small abrasion dorsal.     Extremities: no edema    Skin: no issues other than the penis    Musculoskeletal: no deformities          LABS:  CBC Full  -  ( 12 Oct 2017 04:08 )  WBC Count : 7.1 K/uL  Hemoglobin : 10.7 g/dL  Hematocrit : 32.3 %  Platelet Count - Automated : 125 K/uL  Mean Cell Volume : 92.0 fl  Mean Cell Hemoglobin : 30.5 pg  Mean Cell Hemoglobin Concentration : 33.1 g/dL  Auto Neutrophil # : 5.3 K/uL  Auto Lymphocyte # : 0.6 K/uL  Auto Monocyte # : 1.2 K/uL  Auto Eosinophil # : 0.0 K/uL  Auto Basophil # : x  Auto Neutrophil % : 74.5 %  Auto Lymphocyte % : 7.9 %  Auto Monocyte % : 16.9 %  Auto Eosinophil % : 0.1 %  Auto Basophil % : x    10-12    142  |  106  |  30.0<H>  ----------------------------<  145<H>  3.6   |  27.0  |  0.67    Ca    7.7<L>      12 Oct 2017 04:08  Phos  1.8     10-12  Mg     2.0     10-12    TPro  5.3<L>  /  Alb  2.6<L>  /  TBili  0.6  /  DBili  x   /  AST  33  /  ALT  26  /  AlkPhos  55  10-11        RECENT CULTURES:      LIVER FUNCTIONS - ( 11 Oct 2017 15:58 )  Alb: 2.6 g/dL / Pro: 5.3 g/dL / ALK PHOS: 55 U/L / ALT: 26 U/L / AST: 33 U/L / GGT: x               CAPILLARY BLOOD GLUCOSE  130 (12 Oct 2017 12:00)  180 (12 Oct 2017 06:00)  141 (12 Oct 2017 00:00)  140 (11 Oct 2017 18:00)      RADIOLOGY & ADDITIONAL STUDIES:    ASSESSMENT/PLAN:  84yMale presenting with: Severe TBI. family has arrive on consensus no escalation of care , possible removal of support latter this week./    Neuro: no change in exam, possible removal of support latter this week.    HEENT: no isses    CV: perfusion is adequate    Pulm: vent support for resp failure. adequate oxygenation and ventilation    GI/Nutrition: TF at goal.    /Renal: Stable function     ID: no issues    Lines/Tubes:    Endo: glycemia at target    Skin: Urology to evaluate parmimosis    Proph: heparin     Dispo: ICU      CRITICAL CARE TIME SPENT: 35 minutes.

## 2017-10-12 NOTE — CONSULT NOTE ADULT - ASSESSMENT
Paraphimosis
85M with catastrophic traumatic brain injury s/p fall with multiple areas of ICH, and contusion, and right occipital calvarium and skull base fracture.
ASSESSMENT/PLAN:  86y/o M s/p fall with ICH in critical condition.     -Discussed patient's condition and treatment plan with Dr. Santamaria.  He reviewed CT images as well.  - Recommend neurochecks q1 hour  - Recommend BP control (SBP Goal 140-160)  - HOB 30 degrees  - HOLD anticoagulation & anti-platelet therapy  - Keep NPO and IVFs  - PMH of chronic plavix, Recommend reversal agent to be administered  - Repeat CT Brain in 6 hours with low threshold for rescanning if any acute neurological changes  - Continue with supportive care/management by ASC team    Notified ASC PA about treatment recommendations.

## 2017-10-12 NOTE — CONSULT NOTE ADULT - SUBJECTIVE AND OBJECTIVE BOX
HPI:  85 year old male transferred from [facility] following a fall (takes Plavix). He was found down at nursing home. Family had been calling him and he had not been responding which prompted the staff to look for him. It is unknown how long he had been down for. Prehospital vitals were BP 66/41, HR 80, RR 13 on a ventilator. Received versed and dopamine before arrival to the trauma bay. He arrived intubated, non verbal and non responsive. Primary survey showed    A - Intubated with 7.5mm ET tube and hard C collar in place   B - CTAB   C - 2+ femoral, radial and distal pulses present bilaterally   D - GCS 3T; pinpoint non responsive pupils noted   E - Adequately exposed, log rolled with no step offs noted on exam and appropriately covered with warm blankets    Secondary survey was apparent for a laceration to the forehead, skin tear to the right knuckle, ecchymosis bilateral upper extremities especially in the forearm and hand region and a stage 1 decubitus ulcer to the right hip (no skin tear evident). ED interventions included securing IV access and discontinuing the versed and dopamine. He was subsequently stabilized before being transported to CT scan. Unable to obtain past medical, surgical and social histories, allergies and medications given patient's altered mental status. (08 Oct 2017 15:10)    [place] placed at outside institution and his foreskin was not placed back into the proper position      PAST MEDICAL & SURGICAL HISTORY:      REVIEW OF SYSTEMS:        Reviewed h and p ros    MEDICATIONS  (STANDING):  chlorhexidine 0.12% Liquid 15 milliLiter(s) Swish and Spit two times a day  dextrose 5%. 1000 milliLiter(s) (50 mL/Hr) IV Continuous <Continuous>  dextrose 50% Injectable 12.5 Gram(s) IV Push once  dextrose 50% Injectable 25 Gram(s) IV Push once  dextrose 50% Injectable 25 Gram(s) IV Push once  diltiazem Injectable 10 milliGRAM(s) IV Push once  heparin  Injectable 5000 Unit(s) SubCutaneous every 8 hours  insulin lispro (HumaLOG) corrective regimen sliding scale   SubCutaneous every 6 hours  multiple electrolytes Injection Type 1 1000 milliLiter(s) (100 mL/Hr) IV Continuous <Continuous>  pantoprazole  Injectable 40 milliGRAM(s) IV Push daily    MEDICATIONS  (PRN):  dextrose Gel 1 Dose(s) Oral once PRN Blood Glucose LESS THAN 70 milliGRAM(s)/deciliter  glucagon  Injectable 1 milliGRAM(s) IntraMuscular once PRN Glucose LESS THAN 70 milligrams/deciliter      Allergies    No Known Allergies    Intolerances        SOCIAL HISTORY:    FAMILY HISTORY:      Vital Signs Last 24 Hrs  T(C): 36.9 (12 Oct 2017 16:00), Max: 37.9 (12 Oct 2017 00:00)  T(F): 98.4 (12 Oct 2017 16:00), Max: 100.2 (12 Oct 2017 00:00)  HR: 112 (12 Oct 2017 18:00) (83 - 112)  BP: 122/68 (12 Oct 2017 18:00) (86/52 - 123/73)  BP(mean): 89 (12 Oct 2017 18:00) (64 - 93)  RR: 33 (12 Oct 2017 18:00) (20 - 33)  SpO2: 98% (12 Oct 2017 18:00) (93% - 100%)    PHYSICAL EXAM:    abd- soft, nt, nd    + paraphimosis.  The foreskin was placed back into it's proper position after placed pressure to decrease the edema of his glans penis.      LABS:                        10.7   7.1   )-----------( 125      ( 12 Oct 2017 04:08 )             32.3     10-12    142  |  106  |  30.0<H>  ----------------------------<  145<H>  3.6   |  27.0  |  0.67    Ca    7.7<L>      12 Oct 2017 04:08  Phos  1.8     10-12  Mg     2.0     10-12    TPro  5.3<L>  /  Alb  2.6<L>  /  TBili  0.6  /  DBili  x   /  AST  33  /  ALT  26  /  AlkPhos  55  10-11          RADIOLOGY & ADDITIONAL STUDIES:

## 2017-10-13 NOTE — PROGRESS NOTE ADULT - PROBLEM SELECTOR PLAN 1
-very guarded overall prognosis for meaningful neurological/functional recovery
-grave prognosis
-very guarded overall prognosis for meaningful neurological/functional recovery
-very poor overall prognosis for being able to liberate from ventilator given poor mental state.

## 2017-10-13 NOTE — PROGRESS NOTE ADULT - SUBJECTIVE AND OBJECTIVE BOX
OVERNIGHT EVENTS: doing poorly, appears uncomfortable, labored breathing, rapid afib     Present Symptoms:     Dyspnea: 2  Nausea/Vomiting: No  Anxiety:  Yes   Depression: No  Fatigue: Yes   Loss of appetite: unable     Pain: brows furrowed.             Character-            Duration-            Effect-            Factors-            Frequency-            Location-            Severity-    Review of Systems: Reviewed                    Unable to obtain due to poor mentation   All others negative    MEDICATIONS  (STANDING):  chlorhexidine 0.12% Liquid 15 milliLiter(s) Swish and Spit two times a day  diltiazem Infusion 5 mG/Hr (5 mL/Hr) IV Continuous <Continuous>  HYDROmorphone Infusion 0.5 mG/Hr (0.5 mL/Hr) IV Continuous <Continuous>  LORazepam   Injectable 0.5 milliGRAM(s) IV Push every 6 hours  pantoprazole  Injectable 40 milliGRAM(s) IV Push daily    MEDICATIONS  (PRN):  HYDROmorphone  Injectable 0.5 milliGRAM(s) IV Push every 3 hours PRN Severe Pain (7 - 10)  HYDROmorphone  Injectable 1 milliGRAM(s) IV Push every 2 hours PRN additional dyspnea  LORazepam   Injectable 1 milliGRAM(s) IV Push every 1 hour PRN Agitation    PHYSICAL EXAM:    Vital Signs Last 24 Hrs  T(C): 38.5 (13 Oct 2017 08:00), Max: 38.5 (13 Oct 2017 08:00)  T(F): 101.3 (13 Oct 2017 08:00), Max: 101.3 (13 Oct 2017 08:00)  HR: 112 (13 Oct 2017 12:00) (12 - 167)  BP: 104/64 (13 Oct 2017 12:00) (89/50 - 140/63)  BP(mean): 80 (13 Oct 2017 12:00) (65 - 98)  RR: 36 (13 Oct 2017 12:00) (20 - 36)  SpO2: 96% (13 Oct 2017 12:00) (94% - 99%)    General: lethargic     Karnofsky:  20 %    HEENT: ET tube    Lungs: tachypnea/labored breathing    CV: tachycardia     GI: NG     :santos    MSK: weakness      Skin: no rash    LABS:                      10.5   7.4   )-----------( 124      ( 13 Oct 2017 04:46 )             31.1     10-13    141  |  103  |  32.0<H>  ----------------------------<  108  4.2   |  28.0  |  0.68    Ca    8.1<L>      13 Oct 2017 04:46  Phos  1.3     10-13  Mg     2.0     10-13    TPro  5.3<L>  /  Alb  2.6<L>  /  TBili  0.6  /  DBili  x   /  AST  33  /  ALT  26  /  AlkPhos  55  10-11    I&O's Summary    12 Oct 2017 07:01  -  13 Oct 2017 07:00  --------------------------------------------------------  IN: 4705 mL / OUT: 1515 mL / NET: 3190 mL    13 Oct 2017 07:01  -  13 Oct 2017 12:23  --------------------------------------------------------  IN: 2055 mL / OUT: 245 mL / NET: 1810 mL    RADIOLOGY & ADDITIONAL STUDIES:    ADVANCE DIRECTIVES:

## 2017-10-13 NOTE — PROGRESS NOTE ADULT - PROBLEM SELECTOR PROBLEM 4
Palliative care encounter

## 2017-10-13 NOTE — PROGRESS NOTE ADULT - PROVIDER SPECIALTY LIST ADULT
Critical Care
Neurosurgery
Neurosurgery
Palliative Care
Critical Care

## 2017-10-13 NOTE — PROGRESS NOTE ADULT - ATTENDING COMMENTS
Thank you for the opportunity to assist with the care of this patient.   [facility] Consult [facility] [phone].
Thank you for the opportunity to assist with the care of this patient.   [place] [facility] [phone].
Thank you for the opportunity to assist with the care of this patient.   [facility] [phone].
Thank you for the opportunity to assist with the care of this patient.   [facility] [facility] [phone].

## 2017-10-13 NOTE — PROGRESS NOTE ADULT - PROBLEM SELECTOR PLAN 4
- awaiting more family prior to withdrawal of life support.
-met with family including biological children and step children, and grand children with Marcelino CHAVEZ. Family identifies Danya (step daughter) as primary liaison for the family and for patient. We discussed overall poor prognosis given catastrophic nature of his traumatic injuries, and that he has a low likelihood of getting back to who he was before this. They all understand their options at this point are to continue aggressive measures and discuss trach/PEG/nursing home vs. electively removing him from life support and seeing what will happen, understanding that is likely to be an end of life situation and we would primarily be focusing on comfort. They all as a family agree that being dependent on others and machines for his basic needs would not be in line with his views on quality of life. They all agree that if he were to suffer from a cardiac arrest in his current state that performing CPR would also not be line with his wishes given that it wont bring him back to an acceptable quality of life to him. DNR in place, there are two other children coming from out of state, who will be here by Saturday, at that time they will make further decisions regarding timing of withdrawal of life support.
-spoke to step daughter Danya and Jocelynn regarding his decompensation, they both agree that we should make him comfortable and not escalate treatments any further. The family is still planning on convening tomorrow to make decision regarding timing of vent withdrawal. I explained to them both that he may die before that happens given acute decompensation. They understand.
-met with daughter Jocelynn, who has just been discharged from hospital from an elective surgery. She will be coordinating with the family to set a meeting to talk about next steps. Briefly explained two options of trach/PEG vs. withdrawal of vent and comfort. She will get back to me with regards to a time for family meeting to discuss further.

## 2017-10-13 NOTE — PROGRESS NOTE ADULT - SUBJECTIVE AND OBJECTIVE BOX
INTERVAL HPI/OVERNIGHT EVENTS/SUBJECTIVE: paraphimosis reduced by urology. went to rapid afib overnight.    ICU Vital Signs Last 24 Hrs  T(C): 38.5 (13 Oct 2017 08:00), Max: 38.5 (13 Oct 2017 08:00)  T(F): 101.3 (13 Oct 2017 08:00), Max: 101.3 (13 Oct 2017 08:00)  HR: 129 (13 Oct 2017 09:00) (89 - 167)  BP: 129/83 (13 Oct 2017 09:00) (89/50 - 140/63)  BP(mean): 88 (13 Oct 2017 06:00) (65 - 98)  ABP: --  ABP(mean): --  RR: 33 (13 Oct 2017 09:00) (20 - 35)  SpO2: 95% (13 Oct 2017 09:00) (94% - 100%)      I&O's Detail    12 Oct 2017 07:01  -  13 Oct 2017 07:00  --------------------------------------------------------  IN:    Free Water: 800 mL    multiple electrolytes Injection Type 1: 2300 mL    Pivot: 1380 mL    Solution: 125 mL    Solution: 100 mL  Total IN: 4705 mL    OUT:    Indwelling Catheter - Urethral: 1515 mL  Total OUT: 1515 mL    Total NET: 3190 mL          Mode: CPAP with PS  FiO2: 40  PEEP: 8  PS: 14  MAP: 14    ABG - ( 13 Oct 2017 05:02 )  pH: 7.47  /  pCO2: 40    /  pO2: 53    / HCO3: 28    / Base Excess: 4.7   /  SaO2: 90                  MEDICATIONS  (STANDING):  chlorhexidine 0.12% Liquid 15 milliLiter(s) Swish and Spit two times a day  dextrose 5%. 1000 milliLiter(s) (50 mL/Hr) IV Continuous <Continuous>  dextrose 50% Injectable 12.5 Gram(s) IV Push once  dextrose 50% Injectable 25 Gram(s) IV Push once  dextrose 50% Injectable 25 Gram(s) IV Push once  diltiazem Infusion 5 mG/Hr (5 mL/Hr) IV Continuous <Continuous>  diltiazem Injectable 10 milliGRAM(s) IV Push once  heparin  Injectable 5000 Unit(s) SubCutaneous every 8 hours  insulin lispro (HumaLOG) corrective regimen sliding scale   SubCutaneous every 6 hours  multiple electrolytes Injection Type 1 1000 milliLiter(s) (100 mL/Hr) IV Continuous <Continuous>  pantoprazole  Injectable 40 milliGRAM(s) IV Push daily    MEDICATIONS  (PRN):  dextrose Gel 1 Dose(s) Oral once PRN Blood Glucose LESS THAN 70 milliGRAM(s)/deciliter  glucagon  Injectable 1 milliGRAM(s) IntraMuscular once PRN Glucose LESS THAN 70 milligrams/deciliter  HYDROmorphone  Injectable 0.5 milliGRAM(s) IV Push every 3 hours PRN Severe Pain (7 - 10)      NUTRITION/IVF:     CENTRAL LINE:  LOCATION:   DATE INSERTED:  CVP:  SCVO2:    HOYOS:   DATE INSERTED:    A-LINE:    LOCATION:   DATE INSERTED:   SVV:  CO/CI:     CHEST TUBE:  LOCATION:  DATE INSERTED: OUTPUT/24 HRS:  SUCTION/WATER SEAL:     NG/OG TUBE:  DATE INSERTED:  OUTPUT/24 HRS:    MISC:     PHYSICAL EXAM:    Gen: NAD    Eyes: NAIF    Neurological: GCS 6-7, no focal.    ENMT: NAIF    Neck: no JVD    Pulmonary: coarse bilaterl ronchii bilaterally    Cardiovascular: irreg RR    Gastrointestinal: soft non disteded    Genitourinary: clear urine    Extremities: no edema    Skin: abrassion at extremties    Musculoskeletal: no deformities.          LABS:  CBC Full  -  ( 13 Oct 2017 04:46 )  WBC Count : 7.4 K/uL  Hemoglobin : 10.5 g/dL  Hematocrit : 31.1 %  Platelet Count - Automated : 124 K/uL  Mean Cell Volume : 90.7 fl  Mean Cell Hemoglobin : 30.6 pg  Mean Cell Hemoglobin Concentration : 33.8 g/dL  Auto Neutrophil # : 5.3 K/uL  Auto Lymphocyte # : 0.6 K/uL  Auto Monocyte # : 1.5 K/uL  Auto Eosinophil # : 0.0 K/uL  Auto Basophil # : 0.0 K/uL  Auto Neutrophil % : 70.0 %  Auto Lymphocyte % : 8.0 %  Auto Monocyte % : 19.0 %  Auto Eosinophil % : 1.0 %  Auto Basophil % : x    10-13    141  |  103  |  32.0<H>  ----------------------------<  108  4.2   |  28.0  |  0.68    Ca    8.1<L>      13 Oct 2017 04:46  Phos  1.3     10-13  Mg     2.0     10-13    TPro  5.3<L>  /  Alb  2.6<L>  /  TBili  0.6  /  DBili  x   /  AST  33  /  ALT  26  /  AlkPhos  55  10-11        RECENT CULTURES:      LIVER FUNCTIONS - ( 11 Oct 2017 15:58 )  Alb: 2.6 g/dL / Pro: 5.3 g/dL / ALK PHOS: 55 U/L / ALT: 26 U/L / AST: 33 U/L / GGT: x               CAPILLARY BLOOD GLUCOSE  142 (12 Oct 2017 18:00)  131 (12 Oct 2017 12:00)      RADIOLOGY & ADDITIONAL STUDIES:    ASSESSMENT/PLAN:  84yMale presenting with: Severe TBI, febrile with thick purulent secretions.    Neuro: Analgesia, family has decided to removal of support latter this week.    HEENT:    CV: Started on diltiazem drip.    Pulm:    GI/Nutrition:    /Renal:    ID:    Lines/Tubes:    Endo:    Skin:    Proph:    Dispo:      CRITICAL CARE TIME SPENT: INTERVAL HPI/OVERNIGHT EVENTS/SUBJECTIVE: paraphimosis reduced by urology. went to rapid afib overnight.    ICU Vital Signs Last 24 Hrs  T(C): 38.5 (13 Oct 2017 08:00), Max: 38.5 (13 Oct 2017 08:00)  T(F): 101.3 (13 Oct 2017 08:00), Max: 101.3 (13 Oct 2017 08:00)  HR: 129 (13 Oct 2017 09:00) (89 - 167)  BP: 129/83 (13 Oct 2017 09:00) (89/50 - 140/63)  BP(mean): 88 (13 Oct 2017 06:00) (65 - 98)  ABP: --  ABP(mean): --  RR: 33 (13 Oct 2017 09:00) (20 - 35)  SpO2: 95% (13 Oct 2017 09:00) (94% - 100%)      I&O's Detail    12 Oct 2017 07:01  -  13 Oct 2017 07:00  --------------------------------------------------------  IN:    Free Water: 800 mL    multiple electrolytes Injection Type 1: 2300 mL    Pivot: 1380 mL    Solution: 125 mL    Solution: 100 mL  Total IN: 4705 mL    OUT:    Indwelling Catheter - Urethral: 1515 mL  Total OUT: 1515 mL    Total NET: 3190 mL          Mode: CPAP with PS  FiO2: 40  PEEP: 8  PS: 14  MAP: 14    ABG - ( 13 Oct 2017 05:02 )  pH: 7.47  /  pCO2: 40    /  pO2: 53    / HCO3: 28    / Base Excess: 4.7   /  SaO2: 90                  MEDICATIONS  (STANDING):  chlorhexidine 0.12% Liquid 15 milliLiter(s) Swish and Spit two times a day  dextrose 5%. 1000 milliLiter(s) (50 mL/Hr) IV Continuous <Continuous>  dextrose 50% Injectable 12.5 Gram(s) IV Push once  dextrose 50% Injectable 25 Gram(s) IV Push once  dextrose 50% Injectable 25 Gram(s) IV Push once  diltiazem Infusion 5 mG/Hr (5 mL/Hr) IV Continuous <Continuous>  diltiazem Injectable 10 milliGRAM(s) IV Push once  heparin  Injectable 5000 Unit(s) SubCutaneous every 8 hours  insulin lispro (HumaLOG) corrective regimen sliding scale   SubCutaneous every 6 hours  multiple electrolytes Injection Type 1 1000 milliLiter(s) (100 mL/Hr) IV Continuous <Continuous>  pantoprazole  Injectable 40 milliGRAM(s) IV Push daily    MEDICATIONS  (PRN):  dextrose Gel 1 Dose(s) Oral once PRN Blood Glucose LESS THAN 70 milliGRAM(s)/deciliter  glucagon  Injectable 1 milliGRAM(s) IntraMuscular once PRN Glucose LESS THAN 70 milligrams/deciliter  HYDROmorphone  Injectable 0.5 milliGRAM(s) IV Push every 3 hours PRN Severe Pain (7 - 10)        PHYSICAL EXAM:    Gen: NAD    Eyes: NAIF    Neurological: GCS 6-7, no focal.    ENMT: NAIF    Neck: no JVD    Pulmonary: coarse bilaterl ronchii bilaterally    Cardiovascular: irreg RR    Gastrointestinal: soft non disteded    Genitourinary: clear urine    Extremities: no edema    Skin: abrassion at extremties    Musculoskeletal: no deformities.          LABS:  CBC Full  -  ( 13 Oct 2017 04:46 )  WBC Count : 7.4 K/uL  Hemoglobin : 10.5 g/dL  Hematocrit : 31.1 %  Platelet Count - Automated : 124 K/uL  Mean Cell Volume : 90.7 fl  Mean Cell Hemoglobin : 30.6 pg  Mean Cell Hemoglobin Concentration : 33.8 g/dL  Auto Neutrophil # : 5.3 K/uL  Auto Lymphocyte # : 0.6 K/uL  Auto Monocyte # : 1.5 K/uL  Auto Eosinophil # : 0.0 K/uL  Auto Basophil # : 0.0 K/uL  Auto Neutrophil % : 70.0 %  Auto Lymphocyte % : 8.0 %  Auto Monocyte % : 19.0 %  Auto Eosinophil % : 1.0 %  Auto Basophil % : x    10-13    141  |  103  |  32.0<H>  ----------------------------<  108  4.2   |  28.0  |  0.68    Ca    8.1<L>      13 Oct 2017 04:46  Phos  1.3     10-13  Mg     2.0     10-13    TPro  5.3<L>  /  Alb  2.6<L>  /  TBili  0.6  /  DBili  x   /  AST  33  /  ALT  26  /  AlkPhos  55  10-11        RECENT CULTURES:      LIVER FUNCTIONS - ( 11 Oct 2017 15:58 )  Alb: 2.6 g/dL / Pro: 5.3 g/dL / ALK PHOS: 55 U/L / ALT: 26 U/L / AST: 33 U/L / GGT: x               CAPILLARY BLOOD GLUCOSE  142 (12 Oct 2017 18:00)  131 (12 Oct 2017 12:00)      RADIOLOGY & ADDITIONAL STUDIES:    ASSESSMENT/PLAN:  84yMale presenting with: Severe TBI, febrile with thick purulent secretions.    Neuro: Analgesia, family has decided to removal of support latter this week.    HEENT: no isses    CV: Started on diltiazem drip.    Pulm: thick brown secretions. Vent support for resp failure after trauma. oxygenation worsening.     GI/Nutrition: TF at goal.    /Renal: function stable    ID: if removal of care will be delay; will start empiroc antibiotcs    Lines/Tubes: no issues.    Endo: glycemia at target    Skin: upper extremities abrasion unchanges    Proph: heparin    Dispo: ICU with possible removal of support in the next days      CRITICAL CARE TIME SPENT: 38 minutes.

## 2017-10-13 NOTE — PROGRESS NOTE ADULT - PROBLEM SELECTOR PLAN 3
-due to hemorrhages.
-ativan PRN, remove restraints once properly medicated for symptoms
-due to hemorrhages.
-due to above   -supportive care

## 2017-10-13 NOTE — PROGRESS NOTE ADULT - ASSESSMENT
84M with catastrophic ICH, with contusions, vent dependence, vent dependence, now with worsening respiratory distress, rapid afib, grave prognosis.
85M with catastrophic traumatic brain injury with multiple areas of ICH, contusions, and skull base fracture, vent dependent, with guarded prognosis.
85ym s/p fall with diffuse sdh multiple regions of heme , linear fracture noted right occipital region. Pt is presently wearing a collar will maintain in c collar. At this time no neurosurgical intervention is needed, will continue to monitor and determine if collar is required. Pt upon arrival was on plavix.  Pt seen with DR Santamaria
A/P: 85y Male s/p fall on Plavix found with multifocal ICH (diffuse SAH, diffuse frontal/temporal hemorrhagic contusions, IVH, interhemispheric, tentorium and L convexity SDH). Patient seen, intubated, not sedated  - D/w Dr. Santamaria  - No acute neurosurgical intervention warranted at this time  - Hold therapeutic anticoagulation/antiplatelet therapy   - Supportive care/further medical management per SICU
85M with catastrophic brain injury s/p fall with multiple areas of ICH, contusion, right occipital calvarium/skull base fracture, with very guarded overall prognosis.
85M with catastrophic traumatic brain injury with multiple areas of ICH, contusions, and skull base fracture, vent dependent, with guarded prognosis.

## 2017-10-13 NOTE — PROGRESS NOTE ADULT - PROBLEM/PLAN-4
DISPLAY PLAN FREE TEXT
no loss of consciousness

## 2017-10-13 NOTE — PROGRESS NOTE ADULT - NSHPATTENDINGPLANDISCUSS_GEN_ALL_CORE
Family, Palliative care, all questions answered.
Family, palliative . all questions answered.
ICU and palliative care. all questions answered.
ICU, and family. all questions answered.
Family, Palliative care medicine, all questions answered/
Dr. Robles
Dr. Robles
Marcelino CHAVEZ
Franck CHAVEZ, Dr. Robles

## 2017-10-13 NOTE — PROGRESS NOTE ADULT - PROBLEM SELECTOR PLAN 2
-will require total assistance with all ADLs
-will require total assistance with all ADLs
-worsening labored breathing along with rapid afib  -comfort only - discussed with step daughter Danya as well as biological daughter Jocelynn, dilaudid gtt for comfort.
-no neurosurgical intervention, poor prognosis overall

## 2017-10-13 NOTE — PROGRESS NOTE ADULT - PROBLEM SELECTOR PROBLEM 1
ICH (intracerebral hemorrhage)
ICH (intracerebral hemorrhage)
Ventilator dependent
ICH (intracerebral hemorrhage)

## 2017-10-14 NOTE — DISCHARGE NOTE FOR THE EXPIRED PATIENT - HOSPITAL COURSE
85 year old male transferred from [facility] following a fall (takes Plavix). He was found down at nursing home. Family had been calling him and he had not been responding which prompted the staff to look for him. It is unknown how long he had been down for. Prehospital vitals were BP 66/41, HR 80, RR 13 on a ventilator. Received versed and dopamine before arrival to the trauma bay. He arrived intubated, non verbal and non responsive. Primary survey showed    A - Intubated with 7.5mm ET tube and hard C collar in place   B - CTAB   C - 2+ femoral, radial and distal pulses present bilaterally   D - GCS 3T; pinpoint non responsive pupils noted   E - Adequately exposed, log rolled with no step offs noted on exam and appropriately covered with warm blankets    Secondary survey was apparent for a laceration to the forehead, skin tear to the right knuckle, ecchymosis bilateral upper extremities especially in the forearm and hand region and a stage 1 decubitus ulcer to the right hip (no skin tear evident). ED interventions included securing IV access and discontinuing the versed and dopamine. He was subsequently stabilized before being transported to CT scan. Unable to obtain past medical, surgical and social histories, allergies and medications given patient's altered mental status.   Neurosurgery consulted, deemed non-surviveable brain injury and surgical intervention not offered.  Pt managed with supportive care throughout his SICU admission.  Palliative Care engaged in pt care.  Decision was made for comfort care on 10/13.  Terminal wean on 10/14.
